# Patient Record
Sex: MALE | Race: WHITE | NOT HISPANIC OR LATINO
[De-identification: names, ages, dates, MRNs, and addresses within clinical notes are randomized per-mention and may not be internally consistent; named-entity substitution may affect disease eponyms.]

---

## 2017-08-22 ENCOUNTER — APPOINTMENT (OUTPATIENT)
Dept: OTOLARYNGOLOGY | Facility: CLINIC | Age: 77
End: 2017-08-22
Payer: MEDICARE

## 2017-08-22 VITALS
WEIGHT: 175 LBS | TEMPERATURE: 99.3 F | HEIGHT: 70 IN | HEART RATE: 90 BPM | DIASTOLIC BLOOD PRESSURE: 82 MMHG | BODY MASS INDEX: 25.05 KG/M2 | SYSTOLIC BLOOD PRESSURE: 146 MMHG

## 2017-08-22 PROCEDURE — 31238 NSL/SINS NDSC SRG NSL HEMRRG: CPT | Mod: RT

## 2017-08-22 PROCEDURE — 99214 OFFICE O/P EST MOD 30 MIN: CPT | Mod: 25

## 2017-10-12 ENCOUNTER — APPOINTMENT (OUTPATIENT)
Dept: OTOLARYNGOLOGY | Facility: CLINIC | Age: 77
End: 2017-10-12
Payer: MEDICARE

## 2017-10-12 VITALS
TEMPERATURE: 98.8 F | HEIGHT: 70 IN | DIASTOLIC BLOOD PRESSURE: 76 MMHG | HEART RATE: 81 BPM | SYSTOLIC BLOOD PRESSURE: 147 MMHG | BODY MASS INDEX: 27.2 KG/M2 | WEIGHT: 190 LBS

## 2017-10-12 PROCEDURE — 31231 NASAL ENDOSCOPY DX: CPT

## 2017-10-12 PROCEDURE — 99214 OFFICE O/P EST MOD 30 MIN: CPT | Mod: 25

## 2018-11-07 ENCOUNTER — LABORATORY RESULT (OUTPATIENT)
Age: 78
End: 2018-11-07

## 2018-11-08 ENCOUNTER — APPOINTMENT (OUTPATIENT)
Dept: OTOLARYNGOLOGY | Facility: CLINIC | Age: 78
End: 2018-11-08
Payer: MEDICARE

## 2018-11-08 VITALS
HEART RATE: 71 BPM | DIASTOLIC BLOOD PRESSURE: 81 MMHG | TEMPERATURE: 98.2 F | RESPIRATION RATE: 15 BRPM | OXYGEN SATURATION: 94 % | SYSTOLIC BLOOD PRESSURE: 159 MMHG

## 2018-11-08 PROCEDURE — 99214 OFFICE O/P EST MOD 30 MIN: CPT | Mod: 25

## 2018-11-08 PROCEDURE — 31575 DIAGNOSTIC LARYNGOSCOPY: CPT | Mod: 59

## 2018-11-08 PROCEDURE — 31231 NASAL ENDOSCOPY DX: CPT | Mod: 59

## 2018-11-08 RX ORDER — GUAIFENESIN 600 MG/1
600 TABLET, EXTENDED RELEASE ORAL TWICE DAILY
Qty: 120 | Refills: 3 | Status: ACTIVE | COMMUNITY
Start: 2018-11-08 | End: 1900-01-01

## 2018-11-09 ENCOUNTER — RX RENEWAL (OUTPATIENT)
Age: 78
End: 2018-11-09

## 2018-11-13 ENCOUNTER — RX RENEWAL (OUTPATIENT)
Age: 78
End: 2018-11-13

## 2019-05-20 ENCOUNTER — APPOINTMENT (OUTPATIENT)
Dept: OTOLARYNGOLOGY | Facility: CLINIC | Age: 79
End: 2019-05-20
Payer: MEDICARE

## 2019-05-20 VITALS
SYSTOLIC BLOOD PRESSURE: 132 MMHG | RESPIRATION RATE: 15 BRPM | OXYGEN SATURATION: 93 % | TEMPERATURE: 98.2 F | DIASTOLIC BLOOD PRESSURE: 84 MMHG | HEART RATE: 65 BPM

## 2019-05-20 DIAGNOSIS — J40 BRONCHITIS, NOT SPECIFIED AS ACUTE OR CHRONIC: ICD-10-CM

## 2019-05-20 DIAGNOSIS — R09.82 POSTNASAL DRIP: ICD-10-CM

## 2019-05-20 PROCEDURE — 31575 DIAGNOSTIC LARYNGOSCOPY: CPT

## 2019-05-20 PROCEDURE — 31233 NSL/SINS NDSC DX MAX SINUSC: CPT

## 2019-05-20 PROCEDURE — 99214 OFFICE O/P EST MOD 30 MIN: CPT | Mod: 25

## 2019-05-20 NOTE — CONSULT LETTER
[Consult Letter:] : I had the pleasure of evaluating your patient, [unfilled]. [Dear  ___] : Dear  [unfilled], [Consult Closing:] : Thank you very much for allowing me to participate in the care of this patient.  If you have any questions, please do not hesitate to contact me. [Please see my note below.] : Please see my note below. [Sincerely,] : Sincerely, [FreeTextEntry3] : \par Flako Barnes M.D., FACS, ECNU\par Director Center for Thyroid [facility] Parathyroid Surgery\par [facility] at [facility]\par Certified in Thyroid/Parathyroid/Neck Ultrasound, ECNU/ AIUM\par \par , Department of Otolaryngology\par [facility] [employer] at [facility]\par

## 2019-05-20 NOTE — HISTORY OF PRESENT ILLNESS
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  He has remained DAVID since his treatment.  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  He has been monitored by Atilio Estrada at [facility].  His last PET CT scan was obtained approximately 2 years ago with no evidence of recurrence.  He gained 10 pounds over the last 2 years.  He has restless leg syndrome and spinal stenosis associated with insomnia. 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  He has been in rehabilitation at [facility] and is making good progress. Last weekend he developed some hoarseness, a slight wheeze in his chest and a productive cough with thick brown secretions. He started a Zithromax pack on Monday and he had a low-grade fever and he has since defervesced. The cough has also improved.  He had been using a cough syrup with codeine.  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 He was seen by an allergist and found not to have any allergies.  He also has been having poor sleep and chronic fatigue. He now states that he his restless leg syndrome is much better on homeopathic meds. He does not have sleep apnea.  He was seen in ED at [facility] on May 10th for bronchitis and sinusitis/UTI.  He was treated with Rocephin and Robissin .  He had a pneumonia in April and admitted overnight then treated for 7 days on oral antibiotics.  He has persistent complaints of chest congestion, PND, rhinorrhea, intermittent chronic cough and continued difficulty sleeping due to frequent throat clearing. He had seen a pulmonologist in the past (Dr.Sidney Samaniego)..  He had also been treated by Dr Dolan for chronic sinusitis with nebulizers.  His voice is hoarse intermittently associated with throat clearing and a nocturnal nonproductive cough. He denies fever chills or night sweats.  He denies weight loss and his appetite is good. His TFTs are normal and monitored by his PCP.  A CT scan of the chest was obtained during his last ED visit and revealed Two perifissural nodules in the left upper lobe measuring up to 5 mm. There is no evidence of pneumonia. Additional comments were made regarding 2 simple cysts in the liver. There are less than 1 cm each. There were multiple left rib fractures which were new compared to a CT scan from 2017. The lung nodules appear to be stable compared to 2017.

## 2019-05-20 NOTE — PROCEDURE
[Flexible Endoscope] : examined with the flexible endoscope [Image(s) Captured] : image(s) captured and filed [Hoarseness] : hoarseness not clearly evaluated by indirect laryngoscopy [Complicated Symptoms] : complicated symptoms requiring more thorough examination than provided by mirror [Topical Lidocaine] : topical lidocaine [Serial Number: ___] : Serial Number: [unfilled] [FreeTextEntry1] : Sinus endoscopy diagnostic and drainage [FreeTextEntry3] : Procedure performed for obtaining directed sterile aspirates from the right middle meatus for cultures and sensitivities. The nasal septum is minimally deviated slightly to the left. There are no masses or or edema on the left with normal left middle, inferior and supreme turbinates.  The inferior and middle meati are clear on the left.  Nasopharynx is normal without mass.  On the right the middle meatus is blocked with polypoid edema and steaming mucopurulent drainage posteriorly.  The inferior meatus is edematous along with edema of the mucosa of the middle and inferior turbinates.  The supreme turbinate is not seen on the right.  The sphenoethmoidal recess and frontal recess is clear left and right.   Cultures were sent from directed aspirates from the right middle meatus via Lukin's trap.  [FreeTextEntry2] : Chronic right maxillary sinusitis [de-identified] : The nasal septum is minimally deviated to the right. OMC block/ edema noted on the right with purulent drainage. There are no masses or polyps and the nasal mucosa and secretions are normal. The choanae and posterior nasopharynx are normal without masses or drainage. The Eustachian tube orifices appear patent. The pharynx, including the posterior and lateral pharyngeal walls, the vallecula and base of tongue are normal without ulcerations, lesions or masses. The hypopharynx including the pyriform sinuses open well without pooling of secretions, mucosal lesions or masses. The supraglottic larynx including the epiglottis, petiole, arytenoids, glossoepiglottic, aryepiglottic and pharyngoepiglottic folds are normal without mucosal lesions, ulcerations or masses. The glottis reveals normal false vocal folds. The true vocal folds are glistening white, tense and of equal length, without paralysis, having symmetric mobility on adduction and abduction. There are no mucosal lesions, nodules, cysts, erythroplasia or leukoplakia. The posterior cricoid area has healthy pink mucosa in the interarytenoid area and esophageal inlet. There is minimal thickening/edema of the interarytenoid mucosa suggestive of posterior laryngitis from laryngopharyngeal acid reflux disease. The trachea is clear without narrowing in the immediate subglottic region, without deviation or lesions. \par

## 2019-05-20 NOTE — REASON FOR VISIT
[Ear Drainage] : ear drainage [FreeTextEntry2] : followup for squamous cell cancer of left tonsil treated in 2004 with EBRT and a left modified radical neck dissection, recent treatment for bronchitis, UTI and chronic PND/sinusitis  [FreeTextEntry1] : PCP is Naomi Loving MD

## 2019-05-21 ENCOUNTER — APPOINTMENT (OUTPATIENT)
Dept: OTOLARYNGOLOGY | Facility: CLINIC | Age: 79
End: 2019-05-21
Payer: MEDICARE

## 2019-05-21 VITALS
OXYGEN SATURATION: 96 % | SYSTOLIC BLOOD PRESSURE: 128 MMHG | BODY MASS INDEX: 27.2 KG/M2 | DIASTOLIC BLOOD PRESSURE: 77 MMHG | HEIGHT: 70 IN | WEIGHT: 190 LBS | HEART RATE: 70 BPM

## 2019-05-21 DIAGNOSIS — J34.2 DEVIATED NASAL SEPTUM: ICD-10-CM

## 2019-05-21 PROCEDURE — 31231 NASAL ENDOSCOPY DX: CPT

## 2019-05-21 PROCEDURE — 99214 OFFICE O/P EST MOD 30 MIN: CPT | Mod: 25

## 2019-05-21 NOTE — ASSESSMENT
[FreeTextEntry1] : The patient has recent onset of purulent rhinorrhea from the right nose and cough. He produces approximately less than 1/2 teaspoon of discolored mucus on a daily basis. He was seen by Dr. chaves off yesterday it underwent extensive evaluation. Culture was taken from the right middle meatus and results pending. Our examination today is consistent with infection arising from the right middle meatus, possibly maxillary sinus.\par \par As patient did well after antibiotic treatment for approximately 8 months we most likely will resume that treatment and then reevaluate the patient. We'll contact the patient in approximately 2 days for results of culture.

## 2019-05-21 NOTE — PHYSICAL EXAM
[Nasal Endoscopy Performed] : nasal endoscopy was performed, see procedure section for findings [de-identified] : purulence right MM [Midline] : trachea located in midline position [Normal] : no rashes [FreeTextEntry2] : post neck dissection

## 2019-05-21 NOTE — HISTORY OF PRESENT ILLNESS
[de-identified] : 77 year patient seen day for rhinorrhea. Pat sinus surgery and last seen one His health is reviewed in previous notes. [FreeTextEntry1] : 10-- Patient returns after antibiotics and overall well.\par 5-- He now seen after approximately 8 months last visit. He had been doing well but recently has developed cough and what he describes as less than 1 teaspoon in the day of purulent rhinorrhea. Cultures were taken by Dr. anastacio rodriguez recently and results are pending.

## 2019-05-21 NOTE — PROCEDURE
[Osteomeatal Pathology] : osteomeatal pathology [Topical Lidocaine] : topical lidocaine [Oxymetazoline HCl] : oxymetazoline HCl [FreeTextEntry6] : The endoscopy performed a 0° 4 mm rigid endoscope under local anesthesia. The shows partial septal deviation. Purulent material was noted arising from the right middle meatus region. As patient has undergone endoscopic culturing of this area yesterday we'll not repeat same.

## 2019-05-28 LAB — BACTERIA NOSE AEROBE CULT: ABNORMAL

## 2019-06-20 ENCOUNTER — APPOINTMENT (OUTPATIENT)
Dept: OTOLARYNGOLOGY | Facility: CLINIC | Age: 79
End: 2019-06-20
Payer: MEDICARE

## 2019-06-20 VITALS
SYSTOLIC BLOOD PRESSURE: 135 MMHG | OXYGEN SATURATION: 96 % | HEIGHT: 70 IN | WEIGHT: 185 LBS | BODY MASS INDEX: 26.48 KG/M2 | HEART RATE: 70 BPM | DIASTOLIC BLOOD PRESSURE: 70 MMHG

## 2019-06-20 PROCEDURE — 99214 OFFICE O/P EST MOD 30 MIN: CPT | Mod: 25

## 2019-06-20 PROCEDURE — 31231 NASAL ENDOSCOPY DX: CPT

## 2019-06-20 NOTE — ASSESSMENT
[FreeTextEntry1] : Patient returns after trial of antibiotics for right maxillary ethmoid sinusitis. Patient continues to have purulent postnasal drainage. Given the above and he is right maxillary ethmoid sinusitis there are posing A. right antrostomy, a with removal contents the sinus and ethmoidectomy. He was diagnosed we utilized. Patient's septum is primarily deviated to the right so hopefully septoplasty would not be needed. Patient gave informed consent.

## 2019-06-20 NOTE — PHYSICAL EXAM
[Nasal Endoscopy Performed] : nasal endoscopy was performed, see procedure section for findings [] : septum deviated bilaterally [Removed] : palatine tonsils previously removed [Normal] : no rashes

## 2019-06-20 NOTE — HISTORY OF PRESENT ILLNESS
[de-identified] : 77 year patient seen day for rhinorrhea. Pat sinus surgery and last seen one His health is reviewed in previous notes. [FreeTextEntry1] : 10-- Patient returns after antibiotics and overall well.\par 5-- He now seen after approximately 8 months last visit. He had been doing well but recently has developed cough and what he describes as less than 1 teaspoon in the day of purulent rhinorrhea. Cultures were taken by Dr. anastacio rodriguez recently and results are pending.\par 6-- Patient now returns after several weeks treatment with Bactrim for right maxillary sinusitis. EKG is to have postnasal drainage and subsequent cough. Previous imaging was consistent with above. As originally planned if this did not help treat his sinus problem we'll then consider sinus surgery.

## 2019-07-15 ENCOUNTER — RESULT REVIEW (OUTPATIENT)
Age: 79
End: 2019-07-15

## 2019-07-15 ENCOUNTER — APPOINTMENT (OUTPATIENT)
Dept: OTOLARYNGOLOGY | Facility: AMBULATORY SURGERY CENTER | Age: 79
End: 2019-07-15

## 2019-07-15 ENCOUNTER — OUTPATIENT (OUTPATIENT)
Dept: OUTPATIENT SERVICES | Facility: HOSPITAL | Age: 79
LOS: 1 days | Discharge: HOME | End: 2019-07-15
Payer: MEDICARE

## 2019-07-15 PROCEDURE — 31254 NSL/SINS NDSC W/PRTL ETHMDCT: CPT | Mod: RT

## 2019-07-15 PROCEDURE — 30520 REPAIR OF NASAL SEPTUM: CPT

## 2019-07-15 PROCEDURE — 31267 ENDOSCOPY MAXILLARY SINUS: CPT | Mod: RT

## 2019-07-15 PROCEDURE — 61782 SCAN PROC CRANIAL EXTRA: CPT

## 2019-07-16 LAB
GRAM STN FLD: SIGNIFICANT CHANGE UP
SPECIMEN SOURCE: SIGNIFICANT CHANGE UP

## 2019-07-17 LAB
-  CEFAZOLIN: SIGNIFICANT CHANGE UP
-  CLINDAMYCIN: SIGNIFICANT CHANGE UP
-  ERYTHROMYCIN: SIGNIFICANT CHANGE UP
-  LINEZOLID: SIGNIFICANT CHANGE UP
-  OXACILLIN: SIGNIFICANT CHANGE UP
-  PENICILLIN: SIGNIFICANT CHANGE UP
-  RIFAMPIN: SIGNIFICANT CHANGE UP
-  TRIMETHOPRIM/SULFAMETHOXAZOLE: SIGNIFICANT CHANGE UP
-  VANCOMYCIN: SIGNIFICANT CHANGE UP
CULTURE RESULTS: SIGNIFICANT CHANGE UP
METHOD TYPE: SIGNIFICANT CHANGE UP
ORGANISM # SPEC MICROSCOPIC CNT: SIGNIFICANT CHANGE UP
ORGANISM # SPEC MICROSCOPIC CNT: SIGNIFICANT CHANGE UP
SPECIMEN SOURCE: SIGNIFICANT CHANGE UP

## 2019-07-19 LAB — SURGICAL PATHOLOGY STUDY: SIGNIFICANT CHANGE UP

## 2019-07-25 ENCOUNTER — APPOINTMENT (OUTPATIENT)
Dept: OTOLARYNGOLOGY | Facility: CLINIC | Age: 79
End: 2019-07-25
Payer: MEDICARE

## 2019-07-25 VITALS
SYSTOLIC BLOOD PRESSURE: 134 MMHG | HEIGHT: 70 IN | HEART RATE: 68 BPM | DIASTOLIC BLOOD PRESSURE: 76 MMHG | TEMPERATURE: 98 F

## 2019-07-25 DIAGNOSIS — J34.2 DEVIATED NASAL SEPTUM: ICD-10-CM

## 2019-07-25 DIAGNOSIS — J32.0 CHRONIC MAXILLARY SINUSITIS: ICD-10-CM

## 2019-07-25 DIAGNOSIS — J32.2 CHRONIC ETHMOIDAL SINUSITIS: ICD-10-CM

## 2019-07-25 PROCEDURE — 99024 POSTOP FOLLOW-UP VISIT: CPT

## 2019-07-25 PROCEDURE — 31237 NSL/SINS NDSC SURG BX POLYPC: CPT | Mod: RT,58

## 2019-07-25 NOTE — ASSESSMENT
[FreeTextEntry1] : Patient returns after surgery. Overall he recovering from surgery. We have recommended after examination today and debridement of the right maxillary ethmoid region that he should lavage his nose twice daily with the addition of Pulmicort. He should complete his current antibiotics and we will see him in 6 weeks.

## 2019-07-25 NOTE — PROCEDURE
[Debridement] : debridement  [Anterior rhinoscopy insufficient to account for symptoms] : anterior rhinoscopy insufficient to account for symptoms [Topical Lidocaine] : topical lidocaine [Oxymetazoline HCl] : oxymetazoline HCl [Rigid Endoscope] : examined with a rigid endoscope [Right] : debridement of the right nasal cavity [FreeTextEntry6] : Nasal endoscopy performed for local anesthesia to evaluate the nasal airway and sinuses. Her mucous and debris noted. This was then debrided and inflammation is noted as the outflow tract of the sinuses continues to heal.

## 2019-07-25 NOTE — HISTORY OF PRESENT ILLNESS
[de-identified] : 77 year patient seen day for rhinorrhea. Pat sinus surgery and last seen one His health is reviewed in previous notes. [FreeTextEntry1] : 10-- Patient returns after antibiotics and overall well.\par 5-- He now seen after approximately 8 months last visit. He had been doing well but recently has developed cough and what he describes as less than 1 teaspoon in the day of purulent rhinorrhea. Cultures were taken by Dr. anastacio rodriguez recently and results are pending.\par 6-- Patient now returns after several weeks treatment with Bactrim for right maxillary sinusitis. EKG is to have postnasal drainage and subsequent cough. Previous imaging was consistent with above. As originally planned if this did not help treat his sinus problem we'll then consider sinus surgery.\par 7-- Patient returns after undergoing right antrostomy with removal of cyst and chronic infection with septoplasty access the antrum. Patient has been recovering uneventfully and lavage his nose once per day. He recommended he increase this to twice per day. He still on antibiotics which should be completed in approximately 2 weeks.

## 2019-09-12 ENCOUNTER — APPOINTMENT (OUTPATIENT)
Dept: OTOLARYNGOLOGY | Facility: CLINIC | Age: 79
End: 2019-09-12

## 2020-09-03 ENCOUNTER — APPOINTMENT (OUTPATIENT)
Dept: OTOLARYNGOLOGY | Facility: CLINIC | Age: 80
End: 2020-09-03
Payer: MEDICARE

## 2020-09-03 VITALS
HEART RATE: 75 BPM | DIASTOLIC BLOOD PRESSURE: 84 MMHG | SYSTOLIC BLOOD PRESSURE: 151 MMHG | OXYGEN SATURATION: 95 % | TEMPERATURE: 97.8 F

## 2020-09-03 PROCEDURE — 31575 DIAGNOSTIC LARYNGOSCOPY: CPT

## 2020-09-03 PROCEDURE — 99214 OFFICE O/P EST MOD 30 MIN: CPT | Mod: 25

## 2020-09-03 NOTE — CONSULT LETTER
[Dear  ___] : Dear  [unfilled], [Consult Letter:] : I had the pleasure of evaluating your patient, [unfilled]. [Please see my note below.] : Please see my note below. [Consult Closing:] : Thank you very much for allowing me to participate in the care of this patient.  If you have any questions, please do not hesitate to contact me. [Sincerely,] : Sincerely, [FreeTextEntry3] : \par Flako Barnes M.D., FACS, ECNU\par Director Center for Thyroid & Parathyroid Surgery\par [facility] [place] at [facility]\par Certified in Thyroid/Parathyroid/Neck Ultrasound, ECNU/ AIUM\par \par , Department of Otolaryngology\par [facility] [employer] at [facility]\par

## 2020-09-03 NOTE — PROCEDURE
[Image(s) Captured] : image(s) captured and filed [Complicated Symptoms] : complicated symptoms requiring more thorough examination than provided by mirror [Hoarseness] : hoarseness not clearly evaluated by indirect laryngoscopy [Flexible Endoscope] : examined with the flexible endoscope [Topical Lidocaine] : topical lidocaine [Unable to Cooperate with Mirror] : patient unable to cooperate with mirror [Gag Reflex] : gag reflex preventing mirror examination [Oxymetazoline HCl] : oxymetazoline HCl [Serial Number: ___] : Serial Number: [unfilled] [de-identified] : The nasal septum is minimally deviated to the right. Interval right FESS.  All ostia appear patent without active drainage or polyps.  There are no masses or polyps and the nasal mucosa and secretions are normal. The choanae and posterior nasopharynx are normal without masses or drainage. The Eustachian tube orifices appear patent. The pharynx, including the posterior and lateral pharyngeal walls, the vallecula and base of tongue are normal without ulcerations, lesions or masses. The hypopharynx including the pyriform sinuses open well without pooling of secretions, mucosal lesions or masses. The supraglottic larynx including the epiglottis, petiole, arytenoids, glossoepiglottic, aryepiglottic and pharyngoepiglottic folds are normal without mucosal lesions, ulcerations or masses. The glottis reveals normal false vocal folds. The true vocal folds are glistening white, tense and of equal length, without paralysis, having symmetric mobility on adduction and abduction. There are no mucosal lesions, nodules, cysts, erythroplasia or leukoplakia. The posterior cricoid area has healthy pink mucosa in the interarytenoid area and esophageal inlet. There is minimal thickening/edema of the interarytenoid mucosa suggestive of posterior laryngitis from laryngopharyngeal acid reflux disease.  Mucus is very thick due to radiation in the past. The trachea is clear without narrowing in the immediate subglottic region, without deviation or lesions. \par  [de-identified] : chronic cough/sinusitis

## 2020-09-03 NOTE — HISTORY OF PRESENT ILLNESS
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  He has remained DAVID since his treatment.  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  He has been monitored by Atilio Estrada at [facility].  His last PET CT scan was obtained approximately 2 years ago with no evidence of recurrence.  He gained 10 pounds over the last 2 years.  He has restless leg syndrome and spinal stenosis associated with insomnia. 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  He has been in rehabilitation at [facility] and is making good progress. Last weekend he developed some hoarseness, a slight wheeze in his chest and a productive cough with thick brown secretions. He started a Zithromax pack on Monday and he had a low-grade fever and he has since defervesced. The cough has also improved.  He had been using a cough syrup with codeine.  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 He was seen by an allergist and found not to have any allergies.  He also has been having poor sleep and increasing chronic fatigue. His restless leg syndrome is much better on homeopathic meds. He does not have sleep apnea.  He has now had two FESS procedures with both Dr. Dolan and Dr. Marcus but continues to have chronic sinus drainage despite daily BID saline irrigations with a bulb syringe.  He had not used the Grosan irrigating device as I had recommended as he finds it uncomfortable.   He uses Robissin.  He had a pneumonia in April of last year and treated for 7 days on oral antibiotics.  He has persistent complaints of chest congestion, PND, rhinorrhea, intermittent chronic cough and continued difficulty sleeping due to frequent throat clearing. He had seen a pulmonologist in the past Leonel Owusu.  He has not been back to Dr Dolan for managing chronic sinusitis with nebulizers.  His voice is hoarse intermittently associated with throat clearing and a nocturnal nonproductive cough. He denies fever chills or night sweats.  He denies weight loss and his appetite is good. His TFTs had been normal and monitored by his PCP.  A CT scan of the chest was obtained during his last ED visit last year and revealed two perifissural nodules in the left upper lobe measuring up to 5 mm.  He is not sure if this needs follow up.  Additional comments were made regarding 2 simple cysts in the liver. These are less than 1 cm each. There were multiple left rib fractures which were new compared to a CT scan from 2017. The lung nodules had been stable compared to 2017.

## 2021-09-07 ENCOUNTER — APPOINTMENT (OUTPATIENT)
Dept: OTOLARYNGOLOGY | Facility: CLINIC | Age: 81
End: 2021-09-07
Payer: MEDICARE

## 2021-09-07 VITALS
HEIGHT: 70 IN | BODY MASS INDEX: 31.5 KG/M2 | HEART RATE: 68 BPM | SYSTOLIC BLOOD PRESSURE: 133 MMHG | WEIGHT: 220 LBS | DIASTOLIC BLOOD PRESSURE: 72 MMHG | TEMPERATURE: 98.3 F | OXYGEN SATURATION: 95 %

## 2021-09-07 VITALS — BODY MASS INDEX: 31.5 KG/M2 | HEIGHT: 70 IN | WEIGHT: 220 LBS

## 2021-09-07 DIAGNOSIS — R49.9 UNSPECIFIED VOICE AND RESONANCE DISORDER: ICD-10-CM

## 2021-09-07 PROCEDURE — 99215 OFFICE O/P EST HI 40 MIN: CPT | Mod: 25

## 2021-09-07 PROCEDURE — 31575 DIAGNOSTIC LARYNGOSCOPY: CPT

## 2021-09-07 NOTE — HISTORY OF PRESENT ILLNESS
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  He has remained DAVID since his treatment.  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  He has been monitored by Atilio Estrada at [facility].  His last PET CT scan was obtained approximately 2 years ago with no evidence of recurrence.  He gained 10 pounds over the last 2 years.  He has restless leg syndrome and spinal stenosis associated with insomnia. 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  He has been in rehabilitation at [facility] and is making good progress. Last weekend he developed some hoarseness, a slight wheeze in his chest and a productive cough with thick brown secretions. He started a Zithromax pack on Monday and he had a low-grade fever and he has since defervesced. The cough has also improved.  He had been using a cough syrup with codeine.  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 He was seen by an allergist and found not to have any allergies.  He also has been having poor sleep and increasing chronic fatigue. His restless leg syndrome is much better on homeopathic meds. He does not have sleep apnea.  He has now had two FESS procedures with both Dr. Dolan and Dr. Marcus but continues to have chronic sinus drainage despite daily BID saline irrigations with a bulb syringe.  He uses the Grosan irrigating device sporadically   He uses Robissin.  He had a pneumonia in the past treated for 7 days on oral antibiotics.  He has persistent complaints of chest congestion, PND, rhinorrhea, intermittent chronic cough and continued difficulty sleeping due to frequent throat clearing. He had seen a pulmonologist in the past Leonel Vernon). He complains of dry itchy skin.  He has not been back to ENT for managing chronic sinusitis with nebulizers.  His voice is hoarse intermittently associated with throat clearing and a nocturnal nonproductive cough. He denies fever chills or night sweats.  He denies weight loss and his appetite is good. His TFTs had been normal and monitored by his PCP.  A CT scan of the chest was obtained during his last ED visit last year and revealed two perifissural nodules in the left upper lobe measuring up to 5 mm.  He is not sure if this needs follow up and to see Leonel Vernon his pulmonologist.  Additional comments were made regarding 2 simple cysts in the liver. These are less than 1 cm each. There were multiple left rib fractures which were new compared to a CT scan from 2017. The lung nodules had been stable compared to 2017.

## 2021-09-07 NOTE — PROCEDURE
[Image(s) Captured] : image(s) captured and filed [Unable to Cooperate with Mirror] : patient unable to cooperate with mirror [Gag Reflex] : gag reflex preventing mirror examination [Hoarseness] : hoarseness not clearly evaluated by indirect laryngoscopy [Complicated Symptoms] : complicated symptoms requiring more thorough examination than provided by mirror [Topical Lidocaine] : topical lidocaine [Oxymetazoline HCl] : oxymetazoline HCl [Flexible Endoscope] : examined with the flexible endoscope [Serial Number: ___] : Serial Number: [unfilled] [de-identified] : The nasal septum is minimally deviated to the right. Interval right FESS.  All ostia appear patent without active drainage or polyps.  There are no masses or polyps and the nasal mucosa and secretions are normal. The choanae and posterior nasopharynx are normal without masses or drainage. The Eustachian tube orifices appear patent. The pharynx, including the posterior and lateral pharyngeal walls, the vallecula and base of tongue are normal without ulcerations, lesions or masses. The hypopharynx including the pyriform sinuses open well without pooling of secretions, mucosal lesions or masses. The supraglottic larynx including the epiglottis, petiole, arytenoids, glossoepiglottic, aryepiglottic and pharyngoepiglottic folds are normal without mucosal lesions, ulcerations or masses. The glottis reveals normal false vocal folds. The true vocal folds are glistening white, tense and of equal length, without paralysis, having symmetric mobility on adduction and abduction. There are no mucosal lesions, nodules, cysts, erythroplasia or leukoplakia. The posterior cricoid area has healthy pink mucosa in the interarytenoid area and esophageal inlet. There is slight thickening/edema of the interarytenoid mucosa suggestive of posterior laryngitis from laryngopharyngeal acid reflux disease.  Mucus is very thick due to radiation in the past. The trachea is clear without narrowing in the immediate subglottic region, without deviation or lesions. \par  [de-identified] : chronic cough/sinusitis, head and neck cancer

## 2021-09-07 NOTE — CONSULT LETTER
[Dear  ___] : Dear  [unfilled], [Consult Letter:] : I had the pleasure of evaluating your patient, [unfilled]. [Please see my note below.] : Please see my note below. [Consult Closing:] : Thank you very much for allowing me to participate in the care of this patient.  If you have any questions, please do not hesitate to contact me. [Sincerely,] : Sincerely, [DrYany  ___] : Dr. JOSHUA [FreeTextEntry3] : \par Flako Barnes M.D., FACS, ECNU\par Director Center for Thyroid & Parathyroid Surgery\par [facility]\par Certified in Thyroid/Parathyroid/Neck Ultrasound, ECNU/ AIUM\par \par , Department of Otolaryngology\par [facility] [employer] at [facility]\par

## 2022-09-04 ENCOUNTER — EMERGENCY (EMERGENCY)
Facility: HOSPITAL | Age: 82
LOS: 1 days | Discharge: HOME | End: 2022-09-04
Attending: EMERGENCY MEDICINE | Admitting: EMERGENCY MEDICINE
Payer: MEDICARE

## 2022-09-04 VITALS
TEMPERATURE: 98 F | DIASTOLIC BLOOD PRESSURE: 77 MMHG | SYSTOLIC BLOOD PRESSURE: 147 MMHG | HEART RATE: 75 BPM | OXYGEN SATURATION: 96 % | RESPIRATION RATE: 18 BRPM

## 2022-09-04 VITALS
HEART RATE: 76 BPM | OXYGEN SATURATION: 94 % | TEMPERATURE: 98 F | DIASTOLIC BLOOD PRESSURE: 71 MMHG | SYSTOLIC BLOOD PRESSURE: 130 MMHG | RESPIRATION RATE: 18 BRPM

## 2022-09-04 LAB
ALBUMIN SERPL ELPH-MCNC: 4.8 G/DL — SIGNIFICANT CHANGE UP (ref 3.3–5)
ALP SERPL-CCNC: 62 U/L — SIGNIFICANT CHANGE UP (ref 40–120)
ALT FLD-CCNC: 21 U/L — SIGNIFICANT CHANGE UP (ref 10–45)
ANION GAP SERPL CALC-SCNC: 10 MMOL/L — SIGNIFICANT CHANGE UP (ref 5–17)
APPEARANCE UR: CLEAR — SIGNIFICANT CHANGE UP
APTT BLD: 29 SEC — SIGNIFICANT CHANGE UP (ref 27.5–35.5)
AST SERPL-CCNC: 22 U/L — SIGNIFICANT CHANGE UP (ref 10–40)
BACTERIA # UR AUTO: PRESENT /HPF
BASOPHILS # BLD AUTO: 0.03 K/UL — SIGNIFICANT CHANGE UP (ref 0–0.2)
BASOPHILS NFR BLD AUTO: 0.4 % — SIGNIFICANT CHANGE UP (ref 0–2)
BILIRUB SERPL-MCNC: 0.4 MG/DL — SIGNIFICANT CHANGE UP (ref 0.2–1.2)
BILIRUB UR-MCNC: NEGATIVE — SIGNIFICANT CHANGE UP
BLD GP AB SCN SERPL QL: NEGATIVE — SIGNIFICANT CHANGE UP
BUN SERPL-MCNC: 9 MG/DL — SIGNIFICANT CHANGE UP (ref 7–23)
CALCIUM SERPL-MCNC: 9 MG/DL — SIGNIFICANT CHANGE UP (ref 8.4–10.5)
CHLORIDE SERPL-SCNC: 102 MMOL/L — SIGNIFICANT CHANGE UP (ref 96–108)
CO2 SERPL-SCNC: 27 MMOL/L — SIGNIFICANT CHANGE UP (ref 22–31)
COLOR SPEC: YELLOW — SIGNIFICANT CHANGE UP
COMMENT - URINE: SIGNIFICANT CHANGE UP
CREAT SERPL-MCNC: 0.88 MG/DL — SIGNIFICANT CHANGE UP (ref 0.5–1.3)
DIFF PNL FLD: ABNORMAL
EGFR: 85 ML/MIN/1.73M2 — SIGNIFICANT CHANGE UP
EOSINOPHIL # BLD AUTO: 0.1 K/UL — SIGNIFICANT CHANGE UP (ref 0–0.5)
EOSINOPHIL NFR BLD AUTO: 1.2 % — SIGNIFICANT CHANGE UP (ref 0–6)
EPI CELLS # UR: SIGNIFICANT CHANGE UP /HPF (ref 0–5)
GLUCOSE SERPL-MCNC: 133 MG/DL — HIGH (ref 70–99)
GLUCOSE UR QL: NEGATIVE — SIGNIFICANT CHANGE UP
HCT VFR BLD CALC: 42.1 % — SIGNIFICANT CHANGE UP (ref 39–50)
HGB BLD-MCNC: 14 G/DL — SIGNIFICANT CHANGE UP (ref 13–17)
IMM GRANULOCYTES NFR BLD AUTO: 0.9 % — SIGNIFICANT CHANGE UP (ref 0–1.5)
INR BLD: 0.98 — SIGNIFICANT CHANGE UP (ref 0.88–1.16)
KETONES UR-MCNC: NEGATIVE — SIGNIFICANT CHANGE UP
LEUKOCYTE ESTERASE UR-ACNC: NEGATIVE — SIGNIFICANT CHANGE UP
LYMPHOCYTES # BLD AUTO: 0.96 K/UL — LOW (ref 1–3.3)
LYMPHOCYTES # BLD AUTO: 11.8 % — LOW (ref 13–44)
MCHC RBC-ENTMCNC: 33.3 GM/DL — SIGNIFICANT CHANGE UP (ref 32–36)
MCHC RBC-ENTMCNC: 33.3 PG — SIGNIFICANT CHANGE UP (ref 27–34)
MCV RBC AUTO: 100 FL — SIGNIFICANT CHANGE UP (ref 80–100)
MONOCYTES # BLD AUTO: 0.44 K/UL — SIGNIFICANT CHANGE UP (ref 0–0.9)
MONOCYTES NFR BLD AUTO: 5.4 % — SIGNIFICANT CHANGE UP (ref 2–14)
NEUTROPHILS # BLD AUTO: 6.53 K/UL — SIGNIFICANT CHANGE UP (ref 1.8–7.4)
NEUTROPHILS NFR BLD AUTO: 80.3 % — HIGH (ref 43–77)
NITRITE UR-MCNC: NEGATIVE — SIGNIFICANT CHANGE UP
NRBC # BLD: 0 /100 WBCS — SIGNIFICANT CHANGE UP (ref 0–0)
PH UR: 6 — SIGNIFICANT CHANGE UP (ref 5–8)
PLATELET # BLD AUTO: 277 K/UL — SIGNIFICANT CHANGE UP (ref 150–400)
POTASSIUM SERPL-MCNC: 3.9 MMOL/L — SIGNIFICANT CHANGE UP (ref 3.5–5.3)
POTASSIUM SERPL-SCNC: 3.9 MMOL/L — SIGNIFICANT CHANGE UP (ref 3.5–5.3)
PROT SERPL-MCNC: 7.6 G/DL — SIGNIFICANT CHANGE UP (ref 6–8.3)
PROT UR-MCNC: ABNORMAL MG/DL
PROTHROM AB SERPL-ACNC: 11.6 SEC — SIGNIFICANT CHANGE UP (ref 10.5–13.4)
RBC # BLD: 4.21 M/UL — SIGNIFICANT CHANGE UP (ref 4.2–5.8)
RBC # FLD: 13.5 % — SIGNIFICANT CHANGE UP (ref 10.3–14.5)
RBC CASTS # UR COMP ASSIST: < 5 /HPF — SIGNIFICANT CHANGE UP
RH IG SCN BLD-IMP: POSITIVE — SIGNIFICANT CHANGE UP
SARS-COV-2 RNA SPEC QL NAA+PROBE: NEGATIVE — SIGNIFICANT CHANGE UP
SODIUM SERPL-SCNC: 139 MMOL/L — SIGNIFICANT CHANGE UP (ref 135–145)
SP GR SPEC: 1.01 — SIGNIFICANT CHANGE UP (ref 1–1.03)
UROBILINOGEN FLD QL: 0.2 E.U./DL — SIGNIFICANT CHANGE UP
WBC # BLD: 8.13 K/UL — SIGNIFICANT CHANGE UP (ref 3.8–10.5)
WBC # FLD AUTO: 8.13 K/UL — SIGNIFICANT CHANGE UP (ref 3.8–10.5)
WBC UR QL: ABNORMAL /HPF

## 2022-09-04 PROCEDURE — 74177 CT ABD & PELVIS W/CONTRAST: CPT | Mod: MA

## 2022-09-04 PROCEDURE — 99284 EMERGENCY DEPT VISIT MOD MDM: CPT

## 2022-09-04 PROCEDURE — 73120 X-RAY EXAM OF HAND: CPT | Mod: 26,LT

## 2022-09-04 PROCEDURE — 70450 CT HEAD/BRAIN W/O DYE: CPT | Mod: MA

## 2022-09-04 PROCEDURE — 72125 CT NECK SPINE W/O DYE: CPT | Mod: 26,MA

## 2022-09-04 PROCEDURE — 85610 PROTHROMBIN TIME: CPT

## 2022-09-04 PROCEDURE — 80053 COMPREHEN METABOLIC PANEL: CPT

## 2022-09-04 PROCEDURE — 71045 X-RAY EXAM CHEST 1 VIEW: CPT

## 2022-09-04 PROCEDURE — 85025 COMPLETE CBC W/AUTO DIFF WBC: CPT

## 2022-09-04 PROCEDURE — 70450 CT HEAD/BRAIN W/O DYE: CPT | Mod: 26,MA

## 2022-09-04 PROCEDURE — 86900 BLOOD TYPING SEROLOGIC ABO: CPT

## 2022-09-04 PROCEDURE — 71045 X-RAY EXAM CHEST 1 VIEW: CPT | Mod: 26

## 2022-09-04 PROCEDURE — 71260 CT THORAX DX C+: CPT | Mod: 26,MA

## 2022-09-04 PROCEDURE — 86901 BLOOD TYPING SEROLOGIC RH(D): CPT

## 2022-09-04 PROCEDURE — 99285 EMERGENCY DEPT VISIT HI MDM: CPT | Mod: 25

## 2022-09-04 PROCEDURE — 73030 X-RAY EXAM OF SHOULDER: CPT

## 2022-09-04 PROCEDURE — 72125 CT NECK SPINE W/O DYE: CPT | Mod: MA

## 2022-09-04 PROCEDURE — 86850 RBC ANTIBODY SCREEN: CPT

## 2022-09-04 PROCEDURE — 74177 CT ABD & PELVIS W/CONTRAST: CPT | Mod: 26,MA

## 2022-09-04 PROCEDURE — 85730 THROMBOPLASTIN TIME PARTIAL: CPT

## 2022-09-04 PROCEDURE — 70486 CT MAXILLOFACIAL W/O DYE: CPT | Mod: MA

## 2022-09-04 PROCEDURE — 36415 COLL VENOUS BLD VENIPUNCTURE: CPT

## 2022-09-04 PROCEDURE — 73030 X-RAY EXAM OF SHOULDER: CPT | Mod: 26,RT

## 2022-09-04 PROCEDURE — 73120 X-RAY EXAM OF HAND: CPT

## 2022-09-04 PROCEDURE — 93005 ELECTROCARDIOGRAM TRACING: CPT

## 2022-09-04 PROCEDURE — 70486 CT MAXILLOFACIAL W/O DYE: CPT | Mod: 26,MA

## 2022-09-04 PROCEDURE — 87635 SARS-COV-2 COVID-19 AMP PRB: CPT

## 2022-09-04 PROCEDURE — 81001 URINALYSIS AUTO W/SCOPE: CPT

## 2022-09-04 PROCEDURE — 71260 CT THORAX DX C+: CPT | Mod: MA

## 2022-09-04 RX ORDER — SODIUM CHLORIDE 9 MG/ML
500 INJECTION INTRAMUSCULAR; INTRAVENOUS; SUBCUTANEOUS ONCE
Refills: 0 | Status: COMPLETED | OUTPATIENT
Start: 2022-09-04 | End: 2022-09-04

## 2022-09-04 RX ORDER — SODIUM CHLORIDE 9 MG/ML
250 INJECTION INTRAMUSCULAR; INTRAVENOUS; SUBCUTANEOUS ONCE
Refills: 0 | Status: DISCONTINUED | OUTPATIENT
Start: 2022-09-04 | End: 2022-09-04

## 2022-09-04 RX ADMIN — SODIUM CHLORIDE 500 MILLILITER(S): 9 INJECTION INTRAMUSCULAR; INTRAVENOUS; SUBCUTANEOUS at 11:25

## 2022-09-04 NOTE — ED ADULT TRIAGE NOTE - OTHER COMPLAINTS
EMS reports patient is on Eliquis and Xarelto. Bruising noted to face and right hand.. Petechiae noted to chest. Patient c/o dizziness. EMS reports patient is on Eliquis and Xarelto. Bruising noted to face and left hand.. Petechiae noted to chest. Patient c/o dizziness.

## 2022-09-04 NOTE — ED PROVIDER NOTE - NSFOLLOWUPINSTRUCTIONS_ED_ALL_ED_FT
To patient's primary care doctor: Please reassess patient's anticoagulation use/blood thinner use as he presented to ED after a fall and is a high risk for further falls, bleeding risk.    [facility]  Family Medicine  [address], [address] [place] [address]  Phone: [phone]

## 2022-09-04 NOTE — ED PROVIDER NOTE - NS ED ATTENDING STATEMENT MOD
This was a shared visit with the EVON. I reviewed and verified the documentation and independently performed the documented: Attending Only

## 2022-09-04 NOTE — ED ADULT NURSE NOTE - OTHER COMPLAINTS
EMS reports patient is on Eliquis and Xarelto. Bruising noted to face and left hand.. Petechiae noted to chest. Patient c/o dizziness.

## 2022-09-04 NOTE — ED ADULT NURSE NOTE - OBJECTIVE STATEMENT
Patient presents to the ED via EMS. As per EMS, patient woke up, had breakfast this morning, the aide put him back this morning. As per aide, patient fell off the bed. Patient on blood thinners, noted to have abrasion to right side of head and face, complaining of right shoulder pain and back pain. Patient awake and alert. Denies any neck pain. Upgraded to MD Dixon. Patient presents to the ED via EMS. As per EMS, patient woke up, had breakfast this morning, the aide put him back in bed this morning after breakfast and patient fell.  As per aide, patient fell off the bed or possibly going to the bathroom, aide heard a sound and went to see and patient was on the floor.  Patient on blood thinners, noted to have abrasion to right side of head, face and ear, complaining of right shoulder pain and back pain. Patient also noted to have bruising to bilateral knees.  Patient awake and alert to self. Denies any neck pain. Upgraded to MD Dixon.

## 2022-09-04 NOTE — ED PROVIDER NOTE - PHYSICAL EXAMINATION

## 2022-09-04 NOTE — ED PROVIDER NOTE - OBJECTIVE STATEMENT
81 yo currently on eliquis and xarelto per paperwork w complaints of fall from bed, roll over and fell on the R side, now w R facial pain, L hand bruising, R shoulder pain. Pt noted w bruising to chest. Denies abd pain, or any other extremity injury. Per EMS, platform bed approx 1 foot from floor. 83 yo currently on eliquis and xarelto per paperwork for unclear reason w complaints of fall from bed, roll over and fell on the R side, now w R facial pain, L hand bruising, R shoulder pain. Pt noted w bruising to chest. Denies abd pain, or any other extremity injury. Per EMS, platform bed approx 1 foot from bed, but then after aide stated pt got up from bed quickly, witnessed and fell and hit edge of bed on R side, hurting face, no loc. ambulatory post event.

## 2022-09-04 NOTE — ED PROVIDER NOTE - ATTENDING APP SHARED VISIT CONTRIBUTION OF CARE
84yo F hx of pork allergy, ingested and now w/ epigastric pain, N/V/D. I discussed the plan of care of the patient directly with the PA while the patient was in the Emergency Department. VSS, ambulating well in the ED with minimal assistance, abd soft, no skin findings. I have reviewed the ACP note and agree with the history, exam and plan of care.

## 2022-09-04 NOTE — ED ADULT NURSE NOTE - NSIMPLEMENTINTERV_GEN_ALL_ED
Implemented All Fall with Harm Risk Interventions:  [place] to call system. Call bell, personal items and telephone within reach. Instruct patient to call for assistance. Room bathroom lighting operational. Non-slip footwear when patient is off stretcher. Physically safe environment: no spills, clutter or unnecessary equipment. Stretcher in lowest position, wheels locked, appropriate side rails in place. Provide visual cue, wrist band, yellow gown, etc. Monitor gait and stability. Monitor for mental status changes and reorient to person, place, and time. Review medications for side effects contributing to fall risk. Reinforce activity limits and safety measures with patient and family. Provide visual clues: red socks.

## 2022-09-04 NOTE — ED PROVIDER NOTE - CLINICAL SUMMARY MEDICAL DECISION MAKING FREE TEXT BOX
s/p fall/roll over on ac s/p fall from bed when pt stood up quickly hit R side of face, +facial injury, diffuse petechiae to chest noted, hds, (aide states at baseline currently on 2-3L at home), pt CTs completed and XR to eval for ich/visceral injury, negative, ambulating in ED, VSS, well appearing, aide states looks at baseline, advised reassessment of ac by PMD.

## 2022-09-04 NOTE — ED PROVIDER NOTE - PATIENT PORTAL LINK FT
You can access the FollowMyHealth Patient Portal offered by [facility] by registering at the following website: [email or web address]. By joining [employer]’s FollowMyHealth portal, you will also be able to view your health information using other applications (apps) compatible with our system.

## 2022-09-04 NOTE — ED ADULT NURSE REASSESSMENT NOTE - NS ED NURSE REASSESS COMMENT FT1
As per aide, aide wants to take patient home by cab. Patient ambulatory with steady gait with walker out of ER.
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
## 2022-09-05 DIAGNOSIS — R51.9 HEADACHE, UNSPECIFIED: ICD-10-CM

## 2022-09-05 DIAGNOSIS — Z20.822 CONTACT WITH AND (SUSPECTED) EXPOSURE TO COVID-19: ICD-10-CM

## 2022-09-05 DIAGNOSIS — S60.222A CONTUSION OF LEFT HAND, INITIAL ENCOUNTER: ICD-10-CM
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
## 2022-09-05 DIAGNOSIS — S00.83XA CONTUSION OF OTHER PART OF HEAD, INITIAL ENCOUNTER: ICD-10-CM

## 2022-09-05 DIAGNOSIS — Z79.01 LONG TERM (CURRENT) USE OF ANTICOAGULANTS: ICD-10-CM

## 2022-09-27 ENCOUNTER — APPOINTMENT (OUTPATIENT)
Dept: OTOLARYNGOLOGY | Facility: CLINIC | Age: 82
End: 2022-09-27

## 2022-09-27 VITALS
HEIGHT: 70 IN | WEIGHT: 200 LBS | RESPIRATION RATE: 16 BRPM | OXYGEN SATURATION: 96 % | TEMPERATURE: 97.7 F | BODY MASS INDEX: 28.63 KG/M2 | DIASTOLIC BLOOD PRESSURE: 81 MMHG | SYSTOLIC BLOOD PRESSURE: 136 MMHG | HEART RATE: 70 BPM

## 2022-09-27 DIAGNOSIS — H91.93 UNSPECIFIED HEARING LOSS, BILATERAL: ICD-10-CM

## 2022-09-27 DIAGNOSIS — C77.0 SECONDARY AND UNSPECIFIED MALIGNANT NEOPLASM OF LYMPH NODES OF HEAD, FACE AND NECK: ICD-10-CM

## 2022-09-27 DIAGNOSIS — J04.0 ACUTE LARYNGITIS: ICD-10-CM

## 2022-09-27 DIAGNOSIS — J32.0 CHRONIC MAXILLARY SINUSITIS: ICD-10-CM

## 2022-09-27 DIAGNOSIS — R68.89 OTHER GENERAL SYMPTOMS AND SIGNS: ICD-10-CM

## 2022-09-27 DIAGNOSIS — K21.9 ACUTE LARYNGITIS: ICD-10-CM

## 2022-09-27 DIAGNOSIS — J33.9 NASAL POLYP, UNSPECIFIED: ICD-10-CM

## 2022-09-27 PROCEDURE — 31575 DIAGNOSTIC LARYNGOSCOPY: CPT

## 2022-09-27 PROCEDURE — 99215 OFFICE O/P EST HI 40 MIN: CPT | Mod: 25

## 2022-09-27 NOTE — PROCEDURE
[Image(s) Captured] : image(s) captured and filed [Unable to Cooperate with Mirror] : patient unable to cooperate with mirror [Gag Reflex] : gag reflex preventing mirror examination [Hoarseness] : hoarseness not clearly evaluated by indirect laryngoscopy [Complicated Symptoms] : complicated symptoms requiring more thorough examination than provided by mirror [Topical Lidocaine] : topical lidocaine [Oxymetazoline HCl] : oxymetazoline HCl [Flexible Endoscope] : examined with the flexible endoscope [Serial Number: ___] : Serial Number: [unfilled] [de-identified] : The nasal septum is minimally deviated to the right. Interval right FESS.  All ostia appear patent without active drainage or polyps.  There are no masses or polyps and the nasal mucosa and secretions are normal. The choanae and posterior nasopharynx are normal without masses or drainage. The Eustachian tube orifices appear patent. The pharynx, including the posterior and lateral pharyngeal walls, the vallecula and base of tongue are normal without ulcerations, lesions or masses. The hypopharynx including the pyriform sinuses open well without pooling of secretions, mucosal lesions or masses. The supraglottic larynx including the epiglottis, petiole, arytenoids, glossoepiglottic, aryepiglottic and pharyngoepiglottic folds are normal without mucosal lesions, ulcerations or masses. The glottis reveals normal false vocal folds. The true vocal folds are glistening white, tense and of equal length, without paralysis, having symmetric mobility on adduction and abduction. There are no mucosal lesions, nodules, cysts, erythroplasia or leukoplakia. The posterior cricoid area has healthy pink mucosa in the interarytenoid area and esophageal inlet. There is mild thickening/edema of the interarytenoid mucosa suggestive of posterior laryngitis from laryngopharyngeal acid reflux disease.  Mucus is thick due to radiation in the past but not purulent. The trachea is clear without narrowing in the immediate subglottic region, without deviation or lesions. \par  [de-identified] : chronic cough/sinusitis, head and neck cancer

## 2022-09-27 NOTE — HISTORY OF PRESENT ILLNESS
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  He has remained DAVID since his treatment.  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  He has been monitored by Atilio Estrada at [facility].  His last PET CT scan was obtained approximately 2 years ago with no evidence of recurrence.  He gained 10 pounds over the last 2 years.  He has restless leg syndrome and spinal stenosis associated with insomnia. 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  He has been in rehabilitation at [facility] and is making good progress. Last weekend he developed some hoarseness, a slight wheeze in his chest and a productive cough with thick brown secretions. He started a Zithromax pack on Monday and he had a low-grade fever and he has since defervesced. The cough has also improved.  He had been using a cough syrup with codeine.  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 He was seen by an allergist and found not to have any allergies.  He also has been having poor sleep and increasing chronic fatigue. His restless leg syndrome is much better on homeopathic meds. He does not have sleep apnea.  He has now had two FESS procedures with both Dr. Dolan and Dr. Marcus but continues to have chronic sinus drainage despite daily BID saline irrigations with a bulb syringe.  He uses the Grosan irrigating device sporadically   He uses Robissin.  He had a pneumonia in the past treated for 7 days on oral antibiotics.  He has persistent complaints of chest congestion, PND, rhinorrhea, intermittent chronic cough and continued difficulty sleeping due to frequent throat clearing. He had seen a pulmonologist in the past (Leonel Vernon). He complains of dry itchy skin.  He has not been back to ENT for managing chronic sinusitis with nebulizers.  His voice is hoarse intermittently associated with throat clearing and a nocturnal nonproductive cough.  He had an silent MI and a stent placed this past February.  He denies fever chills or night sweats.  He denies weight loss and his appetite is good. His TFTs had been normal and monitored by his PCP.  A CT scan of the chest was obtained during his last ED visit last year and revealed two perifissural nodules in the left upper lobe measuring up to 5 mm.  He is not sure if this needs follow up and to see Leonel Vernon his pulmonologist.  Additional comments were made regarding 2 simple cysts in the liver. These are less than 1 cm each. There were multiple left rib fractures which were new compared to a CT scan from 2017. The lung nodules had been stable compared to 2017.  His most recent CT of the chest and abdomen was unremarkable with the exception of several small hypo dense nodules in the liver. The lung nodules were no longer identified. He is now off Coumadin and on Elaquis.

## 2022-09-27 NOTE — CONSULT LETTER
[Dear  ___] : Dear  [unfilled], [Consult Letter:] : I had the pleasure of evaluating your patient, [unfilled]. [Please see my note below.] : Please see my note below. [Consult Closing:] : Thank you very much for allowing me to participate in the care of this patient.  If you have any questions, please do not hesitate to contact me. [Sincerely,] : Sincerely, [DrYany  ___] : Dr. JOSHUA [FreeTextEntry3] : \par Flako Barnes M.D., FACS, ECNU\par Director Center for Thyroid & Parathyroid Surgery\par [facility]\par Certified in Thyroid/Parathyroid/Neck Ultrasound, ECNU/ AIUM\par \par , Department of Otolaryngology\par [facility] at [facility]\par

## 2022-11-02 ENCOUNTER — RESULT CHARGE (OUTPATIENT)
Age: 82
End: 2022-11-02

## 2022-11-02 ENCOUNTER — APPOINTMENT (OUTPATIENT)
Dept: INTERNAL MEDICINE | Facility: CLINIC | Age: 82
End: 2022-11-02

## 2022-11-03 ENCOUNTER — LABORATORY RESULT (OUTPATIENT)
Age: 82
End: 2022-11-03

## 2022-11-03 ENCOUNTER — NON-APPOINTMENT (OUTPATIENT)
Age: 82
End: 2022-11-03

## 2022-11-03 ENCOUNTER — APPOINTMENT (OUTPATIENT)
Dept: INTERNAL MEDICINE | Facility: CLINIC | Age: 82
End: 2022-11-03

## 2022-11-03 VITALS
BODY MASS INDEX: 29.35 KG/M2 | TEMPERATURE: 97.2 F | HEIGHT: 70 IN | OXYGEN SATURATION: 94 % | DIASTOLIC BLOOD PRESSURE: 81 MMHG | WEIGHT: 205 LBS | SYSTOLIC BLOOD PRESSURE: 135 MMHG | HEART RATE: 51 BPM

## 2022-11-03 DIAGNOSIS — Z86.711 PERSONAL HISTORY OF PULMONARY EMBOLISM: ICD-10-CM

## 2022-11-03 DIAGNOSIS — E78.5 HYPERLIPIDEMIA, UNSPECIFIED: ICD-10-CM

## 2022-11-03 DIAGNOSIS — Z23 ENCOUNTER FOR IMMUNIZATION: ICD-10-CM

## 2022-11-03 PROCEDURE — 90662 IIV NO PRSV INCREASED AG IM: CPT

## 2022-11-03 PROCEDURE — G0008: CPT

## 2022-11-03 PROCEDURE — 99205 OFFICE O/P NEW HI 60 MIN: CPT | Mod: 25,GC

## 2022-11-03 PROCEDURE — 36415 COLL VENOUS BLD VENIPUNCTURE: CPT

## 2022-11-03 PROCEDURE — 93000 ELECTROCARDIOGRAM COMPLETE: CPT

## 2022-11-10 LAB
ALBUMIN SERPL ELPH-MCNC: 4.4 G/DL
ALP BLD-CCNC: 52 U/L
ALT SERPL-CCNC: 20 U/L
ANION GAP SERPL CALC-SCNC: 11 MMOL/L
APTT BLD: 34.8 SEC
AST SERPL-CCNC: 21 U/L
AT III PPP CHRO-ACNC: 117 %
BASOPHILS # BLD AUTO: 0.03 K/UL
BASOPHILS NFR BLD AUTO: 0.7 %
BILIRUB SERPL-MCNC: 0.4 MG/DL
BUN SERPL-MCNC: 10 MG/DL
CALCIUM SERPL-MCNC: 9.1 MG/DL
CARDIOLIPIN AB SER IA-ACNC: NEGATIVE
CHLORIDE SERPL-SCNC: 103 MMOL/L
CHOLEST SERPL-MCNC: 159 MG/DL
CO2 SERPL-SCNC: 24 MMOL/L
CREAT SERPL-MCNC: 0.78 MG/DL
DNA PLOIDY SPEC FC-IMP: NORMAL
EGFR: 88 ML/MIN/1.73M2
EOSINOPHIL # BLD AUTO: 0.12 K/UL
EOSINOPHIL NFR BLD AUTO: 2.7 %
GLUCOSE SERPL-MCNC: 100 MG/DL
HCT VFR BLD CALC: 41.3 %
HDLC SERPL-MCNC: 60 MG/DL
HGB BLD-MCNC: 13.4 G/DL
IMM GRANULOCYTES NFR BLD AUTO: 0.4 %
INR PPP: 1.29 RATIO
LDLC SERPL CALC-MCNC: 76 MG/DL
LYMPHOCYTES # BLD AUTO: 0.92 K/UL
LYMPHOCYTES NFR BLD AUTO: 20.7 %
MAN DIFF?: NORMAL
MCHC RBC-ENTMCNC: 32.4 GM/DL
MCHC RBC-ENTMCNC: 33.3 PG
MCV RBC AUTO: 102.7 FL
MONOCYTES # BLD AUTO: 0.49 K/UL
MONOCYTES NFR BLD AUTO: 11 %
NEUTROPHILS # BLD AUTO: 2.87 K/UL
NEUTROPHILS NFR BLD AUTO: 64.5 %
NONHDLC SERPL-MCNC: 99 MG/DL
PLATELET # BLD AUTO: 254 K/UL
POTASSIUM SERPL-SCNC: 4.6 MMOL/L
PROT C PPP CHRO-ACNC: 124 %
PROT S AG ACT/NOR PPP IA: 84 %
PROT SERPL-MCNC: 6.7 G/DL
PT BLD: 15.1 SEC
RBC # BLD: 4.02 M/UL
RBC # FLD: 13.2 %
SODIUM SERPL-SCNC: 139 MMOL/L
TRIGL SERPL-MCNC: 111 MG/DL
TSH SERPL-ACNC: 2.87 UIU/ML
WBC # FLD AUTO: 4.45 K/UL

## 2022-11-15 ENCOUNTER — APPOINTMENT (OUTPATIENT)
Dept: INTERNAL MEDICINE | Facility: CLINIC | Age: 82
End: 2022-11-15

## 2022-11-15 VITALS
TEMPERATURE: 97 F | WEIGHT: 205 LBS | DIASTOLIC BLOOD PRESSURE: 89 MMHG | OXYGEN SATURATION: 95 % | HEART RATE: 70 BPM | SYSTOLIC BLOOD PRESSURE: 138 MMHG | RESPIRATION RATE: 16 BRPM | BODY MASS INDEX: 29.41 KG/M2

## 2022-11-15 PROCEDURE — 99214 OFFICE O/P EST MOD 30 MIN: CPT | Mod: GC

## 2022-11-15 RX ORDER — BUDESONIDE 0.5 MG/2ML
0.5 INHALANT ORAL TWICE DAILY
Qty: 180 | Refills: 4 | Status: DISCONTINUED | COMMUNITY
Start: 2017-10-12 | End: 2022-11-15

## 2022-11-16 NOTE — END OF VISIT
[FreeTextEntry3] : I saw and evaluated the patient.  The findings and assessment were discussed with the resident and I agree with resident’s plan as documented in the above, in the resident’s note.\par \par Pertinent exam findings: Well-appearing, NAD. \par \par SOB w/ wheezing: Likely COPD exacerbation.  No evidence of infection.  At baseline O2.  Prednisone, symbicort.  Pulm follow up\par Refractory itching: Refractory to anti-histamines.  Trial gabapentin for relief.\par MDD: SW referral for bridge to therapy.

## 2022-11-16 NOTE — ASSESSMENT
[FreeTextEntry1] : 82 y M with PMH HTN, anemia, hypothyroidism, bronchitis, CAD s/p stent, CVA (2014) presenting with ~1 week onset of worsening cough, sputum production, and CHRISTIAN, likely 2/2 COPD exacerbation

## 2022-11-16 NOTE — PLAN
[FreeTextEntry1] : #CHRISTIAN \par Likely 2/2 COPD exacerbation, currently with increased cough frequency and sputum production, with absence of fever, tachypnea, hypoxia. Currently only on albuterol and budesonide. \par -Prednisone 40 mg PO for 5 days \par -Stop budesonide inhaler and start Symbicort \par -Continue albuterol inhaler \par -Pulm appt on 11/29\par \par #Pruritis \par 2/2 seasonal allergies. Has trialed hydroxyzine and other meds in the past without relief\par -Start gabapentin 100 mg TID \par \par #MDD\par Currently endorses depressed mood despite being on Lexapro, Mirtazapine, Trazadone. States mood is controlled but would like to speak with a counselor. No active suicidal ideation currently \par -Psychiatrist referral placed\par - tasked to contact pt for resources for psychotherapists

## 2022-11-16 NOTE — REVIEW OF SYSTEMS
[Fever] : no fever [Chills] : no chills [Hot Flashes] : no hot flashes [Night Sweats] : no night sweats [Recent Change In Weight] : ~T no recent weight change [Suicidal] : not suicidal [Anxiety] : no anxiety

## 2022-11-16 NOTE — PHYSICAL EXAM
[de-identified] : decreased breath sounds, no wheezing  [de-identified] : LUE weakness from prior CVA

## 2022-11-16 NOTE — HISTORY OF PRESENT ILLNESS
[FreeTextEntry1] : Cough, CHRISTIAN [de-identified] : 82 y M with PMH anemia, hypothyroidism, bronchitis, CAD s/p stent, CVA (2014) presenting with ~1 week onset of weakness, increased cough frequency with increased sputum production (clear) despite using nebulizer treatments 2x day. Additionally pt states he has worsened SOB on exertion, stating he is even dyspneic speaking now. States last episode of PNA was January for which he was treated with antibiotics. Pt and HHA endorses wheezing however none observed today on exam. Has appt with pulmonology and cardiology in the upcoming month \par \par Additionally per pt and HHA, reports continued depressed mood, impaired sleep and concentration. Has lost follow up with counselor and would like to speak to someone and believe it would help. Denies active suicidal ideation. \par \par

## 2022-11-28 ENCOUNTER — NON-APPOINTMENT (OUTPATIENT)
Age: 82
End: 2022-11-28

## 2022-11-29 ENCOUNTER — APPOINTMENT (OUTPATIENT)
Dept: PULMONOLOGY | Facility: CLINIC | Age: 82
End: 2022-11-29

## 2022-11-29 VITALS
HEIGHT: 70 IN | SYSTOLIC BLOOD PRESSURE: 147 MMHG | HEART RATE: 73 BPM | TEMPERATURE: 97.1 F | DIASTOLIC BLOOD PRESSURE: 78 MMHG | OXYGEN SATURATION: 95 % | BODY MASS INDEX: 29.35 KG/M2 | WEIGHT: 205 LBS

## 2022-11-29 PROCEDURE — 71045 X-RAY EXAM CHEST 1 VIEW: CPT

## 2022-11-29 PROCEDURE — 94010 BREATHING CAPACITY TEST: CPT

## 2022-12-01 NOTE — PHYSICAL EXAM
[Normal Rate/Rhythm] : normal rate/rhythm [No Resp Distress] : no resp distress [Clear to Auscultation Bilaterally] : clear to auscultation bilaterally [No Focal Deficits] : no focal deficits [Oriented x3] : oriented x3 [Normal Affect] : normal affect [TextBox_2] : clear post surgical ent [TextBox_11] : distortion of oral pharynx [TextBox_44] : surgical findings

## 2022-12-01 NOTE — REASON FOR VISIT
[Initial] : an initial visit [TextBox_44] : patient with both a stroke and surgery for oral cancer with secretion issues

## 2022-12-01 NOTE — REVIEW OF SYSTEMS
[Cough] : cough [Frequency] : dysuria [Myalgias] : myalgias [Negative] : Hematologic [TextBox_3] : here with aide [TextBox_14] : status post surgery or oral cavity with problems with sweeping secretions [TextBox_30] : much better since steroids [TextBox_119] : some impariment from stroke

## 2022-12-01 NOTE — PROCEDURE
[FreeTextEntry1] : could not do spirometry well, but there appears to be no airflow obsttruction\par \par chest film with no clear infilltrates

## 2022-12-01 NOTE — DISCUSSION/SUMMARY
[FreeTextEntry1] : the steroid that were given by dr. escalera were effectgive\par \par i'm not sure he has any true lung diseae\par \par unfortunately, chronic aspiration will always be a problem as will secretions and this treatment may be needed again

## 2022-12-01 NOTE — HISTORY OF PRESENT ILLNESS
[TextBox_4] : surgery for neck cancer  years ago\par \par stroke  2014\par \par smoker stopped 25 years.\par \par had pneumonia several times\par \par prednisone  a few days  cleared things\par \par and also on symbicort\par \par likely the treatment with steroid has resolved things\par \par

## 2022-12-06 ENCOUNTER — APPOINTMENT (OUTPATIENT)
Dept: HEART AND VASCULAR | Facility: CLINIC | Age: 82
End: 2022-12-06

## 2022-12-06 ENCOUNTER — APPOINTMENT (OUTPATIENT)
Dept: INTERNAL MEDICINE | Facility: CLINIC | Age: 82
End: 2022-12-06

## 2022-12-06 ENCOUNTER — NON-APPOINTMENT (OUTPATIENT)
Age: 82
End: 2022-12-06

## 2022-12-06 VITALS
TEMPERATURE: 97.4 F | HEART RATE: 84 BPM | OXYGEN SATURATION: 97 % | DIASTOLIC BLOOD PRESSURE: 82 MMHG | BODY MASS INDEX: 29.92 KG/M2 | HEIGHT: 70 IN | WEIGHT: 209 LBS | SYSTOLIC BLOOD PRESSURE: 132 MMHG

## 2022-12-06 VITALS
OXYGEN SATURATION: 96 % | DIASTOLIC BLOOD PRESSURE: 82 MMHG | BODY MASS INDEX: 29.92 KG/M2 | HEIGHT: 70 IN | SYSTOLIC BLOOD PRESSURE: 124 MMHG | HEART RATE: 73 BPM | WEIGHT: 209 LBS | TEMPERATURE: 97.3 F

## 2022-12-06 DIAGNOSIS — Z79.01 LONG TERM (CURRENT) USE OF ANTICOAGULANTS: ICD-10-CM

## 2022-12-06 DIAGNOSIS — C09.9 MALIGNANT NEOPLASM OF TONSIL, UNSPECIFIED: ICD-10-CM

## 2022-12-06 DIAGNOSIS — Z00.00 ENCOUNTER FOR GENERAL ADULT MEDICAL EXAMINATION W/OUT ABNORMAL FINDINGS: ICD-10-CM

## 2022-12-06 DIAGNOSIS — J42 UNSPECIFIED CHRONIC BRONCHITIS: ICD-10-CM

## 2022-12-06 DIAGNOSIS — I47.20 VENTRICULAR TACHYCARDIA, UNSPECIFIED: ICD-10-CM

## 2022-12-06 PROCEDURE — 99203 OFFICE O/P NEW LOW 30 MIN: CPT

## 2022-12-06 PROCEDURE — 99214 OFFICE O/P EST MOD 30 MIN: CPT | Mod: GC

## 2022-12-06 PROCEDURE — 36415 COLL VENOUS BLD VENIPUNCTURE: CPT

## 2022-12-06 PROCEDURE — 93000 ELECTROCARDIOGRAM COMPLETE: CPT

## 2022-12-06 RX ORDER — BUDESONIDE 0.5 MG/2ML
0.5 INHALANT ORAL
Qty: 180 | Refills: 2 | Status: DISCONTINUED | COMMUNITY
Start: 2019-07-25 | End: 2022-12-06

## 2022-12-06 RX ORDER — FLUTICASONE PROPIONATE 50 UG/1
50 SPRAY, METERED NASAL TWICE DAILY
Qty: 1 | Refills: 2 | Status: DISCONTINUED | COMMUNITY
Start: 2018-11-08 | End: 2022-12-06

## 2022-12-06 RX ORDER — SULFAMETHOXAZOLE AND TRIMETHOPRIM 800; 160 MG/1; MG/1
800-160 TABLET ORAL TWICE DAILY
Qty: 20 | Refills: 0 | Status: DISCONTINUED | COMMUNITY
Start: 2018-11-13 | End: 2022-12-06

## 2022-12-06 RX ORDER — SULFAMETHOXAZOLE AND TRIMETHOPRIM 800; 160 MG/1; MG/1
800-160 TABLET ORAL TWICE DAILY
Qty: 60 | Refills: 1 | Status: DISCONTINUED | COMMUNITY
Start: 2019-06-20 | End: 2022-12-06

## 2022-12-06 NOTE — HISTORY OF PRESENT ILLNESS
[FreeTextEntry1] : 82 M HTN Anemia, Hypothyroid, Bronchiectasis, CAD s/p Stent [facility], CVA w/ L hemiparesis in 2014, L tonsillar SCC s/p RX and radical neck dissection in 2004 c/b fibrosis, hs/ DVT/PE in 2017 Former extensive smoking history walks with a walker\par \par \par \par here with his private AID who reports he was on multiple blood thinners including warfarin, DOAC, Plavixdoes not know why ? 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

## 2022-12-06 NOTE — ASSESSMENT
[FreeTextEntry1] : - was on AC unclear why aid will get records from [facility]\par - CAD he is on asa and atorvastatin 40 mg daily\par - repeat echocardiogram for PHTN given his lung history \par - 14 day holter monitor for AF\par - fu in three months\par - referred to private pay psych at aids request

## 2022-12-25 PROBLEM — I47.20 VENTRICULAR TACHYCARDIA: Status: ACTIVE | Noted: 2022-12-25

## 2022-12-29 ENCOUNTER — APPOINTMENT (OUTPATIENT)
Dept: HEART AND VASCULAR | Facility: CLINIC | Age: 82
End: 2022-12-29
Payer: MEDICARE

## 2022-12-29 VITALS — BODY MASS INDEX: 29.92 KG/M2 | WEIGHT: 209 LBS | HEIGHT: 70 IN

## 2022-12-29 PROCEDURE — 78452 HT MUSCLE IMAGE SPECT MULT: CPT

## 2022-12-29 PROCEDURE — ZZZZZ: CPT

## 2022-12-29 PROCEDURE — A9500: CPT

## 2022-12-29 PROCEDURE — 93015 CV STRESS TEST SUPVJ I&R: CPT

## 2022-12-29 PROCEDURE — 93306 TTE W/DOPPLER COMPLETE: CPT

## 2023-01-05 ENCOUNTER — APPOINTMENT (OUTPATIENT)
Dept: HEART AND VASCULAR | Facility: CLINIC | Age: 83
End: 2023-01-05

## 2023-01-09 ENCOUNTER — APPOINTMENT (OUTPATIENT)
Dept: INTERNAL MEDICINE | Facility: CLINIC | Age: 83
End: 2023-01-09
Payer: MEDICARE

## 2023-01-09 DIAGNOSIS — G89.29 DORSALGIA, UNSPECIFIED: ICD-10-CM

## 2023-01-09 DIAGNOSIS — L29.9 PRURITUS, UNSPECIFIED: ICD-10-CM

## 2023-01-09 DIAGNOSIS — M54.9 DORSALGIA, UNSPECIFIED: ICD-10-CM

## 2023-01-09 PROCEDURE — 99443: CPT | Mod: 95

## 2023-01-09 NOTE — END OF VISIT
[] : Resident [FreeTextEntry3] : \par 81 yo m w/h/o cad, cva here for back pain\par #renew PT for chronic back pain- \par #chronic pruritus- maybe releated to psych and can see derm to evaluate too\par \par  [Time Spent: ___ minutes] : I have spent [unfilled] minutes of time on the encounter.

## 2023-01-09 NOTE — HISTORY OF PRESENT ILLNESS
[Home] : at home, [unfilled] , at the time of the visit. [Medical Office: ([facility])___] : at the medical office located in  [Formal Caregiver] : formal caregiver [Verbal consent obtained from patient] : the patient, [unfilled] [FreeTextEntry1] : Renewal of referrals [de-identified] : Patient is an 81yo M w PMhx of anemia, hypothyroidism, bronchitis, CAD s/p stent, CVA (21014), COPD, and chronic microaspirations presenting for telephonic visit for renewal of referrals. Caregiver Lupe and patient speaking on phone, report need updated PT referral. In addition, persistent pruritus, aide reports vigorous itching causing blanching of skin. Otherwise reports needing lexapro renewal. Otherwise reports will continue to try tor each private psych contact that Dr. Wiley provided.  No other complaints.\par Denies fever, chills, chest pain, palpitations, cough, SOB, wheeze, abdominal pain, nausea, vomiting, diarrhea, dysuria, hematuria, or polyuria.

## 2023-01-09 NOTE — ASSESSMENT
[FreeTextEntry1] : Patient is an 81yo M w PMhx of anemia, hypothyroidism, bronchitis, CAD s/p stent, CVA (21014), COPD, and chronic microaspirations presenting for telephonic visit for renewal of referrals.\par \par #Chronic LBP\par - Will send PT referral\par \par #Pruritus\par - Reports persistent pruritus, minimal relief w Cromolyn sodium and hydroxyzine\par - Aide believes psych component to persistent itching\par - Dermatology referral sent\par \par #Depression\par - Aide in process of continuing to reach-out to private pay psychiatrist to establish care for patient\par - Lexapro renewal ordered.

## 2023-01-25 ENCOUNTER — EMERGENCY (EMERGENCY)
Facility: HOSPITAL | Age: 83
LOS: 1 days | Discharge: HOME | End: 2023-01-25
Attending: EMERGENCY MEDICINE | Admitting: EMERGENCY MEDICINE
Payer: MEDICARE

## 2023-01-25 VITALS
HEART RATE: 70 BPM | DIASTOLIC BLOOD PRESSURE: 81 MMHG | TEMPERATURE: 98 F | OXYGEN SATURATION: 95 % | SYSTOLIC BLOOD PRESSURE: 110 MMHG | RESPIRATION RATE: 18 BRPM

## 2023-01-25 VITALS
WEIGHT: 199.96 LBS | SYSTOLIC BLOOD PRESSURE: 148 MMHG | TEMPERATURE: 98 F | HEART RATE: 84 BPM | DIASTOLIC BLOOD PRESSURE: 89 MMHG | OXYGEN SATURATION: 94 % | RESPIRATION RATE: 16 BRPM

## 2023-01-25 LAB
ALBUMIN SERPL ELPH-MCNC: 4.2 G/DL — SIGNIFICANT CHANGE UP (ref 3.3–5)
ALP SERPL-CCNC: 60 U/L — SIGNIFICANT CHANGE UP (ref 40–120)
ALT FLD-CCNC: 21 U/L — SIGNIFICANT CHANGE UP (ref 10–45)
ANION GAP SERPL CALC-SCNC: 10 MMOL/L — SIGNIFICANT CHANGE UP (ref 5–17)
AST SERPL-CCNC: 23 U/L — SIGNIFICANT CHANGE UP (ref 10–40)
BASOPHILS # BLD AUTO: 0.05 K/UL — SIGNIFICANT CHANGE UP (ref 0–0.2)
BASOPHILS NFR BLD AUTO: 0.7 % — SIGNIFICANT CHANGE UP (ref 0–2)
BILIRUB SERPL-MCNC: 0.3 MG/DL — SIGNIFICANT CHANGE UP (ref 0.2–1.2)
BUN SERPL-MCNC: 8 MG/DL — SIGNIFICANT CHANGE UP (ref 7–23)
CALCIUM SERPL-MCNC: 9.4 MG/DL — SIGNIFICANT CHANGE UP (ref 8.4–10.5)
CHLORIDE SERPL-SCNC: 102 MMOL/L — SIGNIFICANT CHANGE UP (ref 96–108)
CO2 SERPL-SCNC: 27 MMOL/L — SIGNIFICANT CHANGE UP (ref 22–31)
CREAT SERPL-MCNC: 0.79 MG/DL — SIGNIFICANT CHANGE UP (ref 0.5–1.3)
EGFR: 88 ML/MIN/1.73M2 — SIGNIFICANT CHANGE UP
EOSINOPHIL # BLD AUTO: 0.21 K/UL — SIGNIFICANT CHANGE UP (ref 0–0.5)
EOSINOPHIL NFR BLD AUTO: 3 % — SIGNIFICANT CHANGE UP (ref 0–6)
FLUAV AG NPH QL: SIGNIFICANT CHANGE UP
FLUBV AG NPH QL: SIGNIFICANT CHANGE UP
GLUCOSE SERPL-MCNC: 110 MG/DL — HIGH (ref 70–99)
HCT VFR BLD CALC: 39.9 % — SIGNIFICANT CHANGE UP (ref 39–50)
HGB BLD-MCNC: 13.4 G/DL — SIGNIFICANT CHANGE UP (ref 13–17)
IMM GRANULOCYTES NFR BLD AUTO: 0.3 % — SIGNIFICANT CHANGE UP (ref 0–0.9)
LYMPHOCYTES # BLD AUTO: 0.58 K/UL — LOW (ref 1–3.3)
LYMPHOCYTES # BLD AUTO: 8.2 % — LOW (ref 13–44)
MCHC RBC-ENTMCNC: 33.3 PG — SIGNIFICANT CHANGE UP (ref 27–34)
MCHC RBC-ENTMCNC: 33.6 GM/DL — SIGNIFICANT CHANGE UP (ref 32–36)
MCV RBC AUTO: 99 FL — SIGNIFICANT CHANGE UP (ref 80–100)
MONOCYTES # BLD AUTO: 0.77 K/UL — SIGNIFICANT CHANGE UP (ref 0–0.9)
MONOCYTES NFR BLD AUTO: 10.9 % — SIGNIFICANT CHANGE UP (ref 2–14)
NEUTROPHILS # BLD AUTO: 5.44 K/UL — SIGNIFICANT CHANGE UP (ref 1.8–7.4)
NEUTROPHILS NFR BLD AUTO: 76.9 % — SIGNIFICANT CHANGE UP (ref 43–77)
NRBC # BLD: 0 /100 WBCS — SIGNIFICANT CHANGE UP (ref 0–0)
NT-PROBNP SERPL-SCNC: 99 PG/ML — SIGNIFICANT CHANGE UP (ref 0–300)
PLATELET # BLD AUTO: 234 K/UL — SIGNIFICANT CHANGE UP (ref 150–400)
POTASSIUM SERPL-MCNC: 4.4 MMOL/L — SIGNIFICANT CHANGE UP (ref 3.5–5.3)
POTASSIUM SERPL-SCNC: 4.4 MMOL/L — SIGNIFICANT CHANGE UP (ref 3.5–5.3)
PROT SERPL-MCNC: 7.2 G/DL — SIGNIFICANT CHANGE UP (ref 6–8.3)
RBC # BLD: 4.03 M/UL — LOW (ref 4.2–5.8)
RBC # FLD: 13 % — SIGNIFICANT CHANGE UP (ref 10.3–14.5)
RSV RNA NPH QL NAA+NON-PROBE: SIGNIFICANT CHANGE UP
SARS-COV-2 RNA SPEC QL NAA+PROBE: DETECTED
SODIUM SERPL-SCNC: 139 MMOL/L — SIGNIFICANT CHANGE UP (ref 135–145)
TROPONIN T SERPL-MCNC: <0.01 NG/ML — SIGNIFICANT CHANGE UP (ref 0–0.01)
WBC # BLD: 7.07 K/UL — SIGNIFICANT CHANGE UP (ref 3.8–10.5)
WBC # FLD AUTO: 7.07 K/UL — SIGNIFICANT CHANGE UP (ref 3.8–10.5)

## 2023-01-25 PROCEDURE — 99285 EMERGENCY DEPT VISIT HI MDM: CPT | Mod: FS,CS

## 2023-01-25 PROCEDURE — 85025 COMPLETE CBC W/AUTO DIFF WBC: CPT

## 2023-01-25 PROCEDURE — 99285 EMERGENCY DEPT VISIT HI MDM: CPT | Mod: 25

## 2023-01-25 PROCEDURE — 36415 COLL VENOUS BLD VENIPUNCTURE: CPT

## 2023-01-25 PROCEDURE — 83880 ASSAY OF NATRIURETIC PEPTIDE: CPT

## 2023-01-25 PROCEDURE — 87637 SARSCOV2&INF A&B&RSV AMP PRB: CPT

## 2023-01-25 PROCEDURE — 80053 COMPREHEN METABOLIC PANEL: CPT

## 2023-01-25 PROCEDURE — 71045 X-RAY EXAM CHEST 1 VIEW: CPT

## 2023-01-25 PROCEDURE — 84484 ASSAY OF TROPONIN QUANT: CPT

## 2023-01-25 PROCEDURE — 71045 X-RAY EXAM CHEST 1 VIEW: CPT | Mod: 26

## 2023-01-25 RX ORDER — MOMETASONE FUROATE 50 UG/1
2 SPRAY NASAL
Qty: 1 | Refills: 0
Start: 2023-01-25 | End: 2023-01-31

## 2023-01-25 NOTE — ED PROVIDER NOTE - NSFOLLOWUPINSTRUCTIONS_ED_ALL_ED_FT
COVID-19 (Coronavirus Disease 2019)  WHAT YOU NEED TO KNOW:  COVID-19 is the disease caused by a coronavirus first discovered in December 2019. Coronaviruses generally cause upper respiratory (nose, throat, and lung) infections, such as a cold. The 2019 virus spreads quickly and easily. It can be spread starting 2 to 3 days before symptoms even begin.  DISCHARGE INSTRUCTIONS:  Call your local emergency number (911 in the ) if:   •You have trouble breathing or shortness of breath at rest.  •You have chest pain or pressure that lasts longer than 5 minutes.  •You become confused or hard to wake.  •Your lips or face are blue.  Return to the emergency department if:   •You have a fever of 104°F (40°C) or higher  Call your doctor if:   •You have symptoms of COVID-19.  •You have questions or concerns about your condition or care.  Medicines: You may need any of the following:  •Decongestants help reduce nasal congestion and help you breathe more easily. If you take decongestant pills, they may make you feel restless or cause problems with your sleep. Do not use decongestant sprays for more than a few days.  •Cough suppressants help reduce coughing. Ask your healthcare provider which type of cough medicine is best for you.  •Acetaminophen decreases pain and fever. It is available without a doctor's order. Ask how much to take and how often to take it. Follow directions. Read the labels of all other medicines you are using to see if they also contain acetaminophen, or ask your doctor or pharmacist. Acetaminophen can cause liver damage if not taken correctly.  •NSAIDs, such as ibuprofen, help decrease swelling, pain, and fever. This medicine is available with or without a doctor's order. NSAIDs can cause stomach bleeding or kidney problems in certain people. If you take blood thinner medicine, always ask your healthcare provider if NSAIDs are safe for you. Always read the medicine label and follow directions.  •Blood thinners help prevent blood clots. Clots can cause strokes, heart attacks, and death. The following are general safety guidelines to follow while you are taking a blood thinner:?Watch for bleeding and bruising while you take blood thinners. Watch for bleeding from your gums or nose. Watch for blood in your urine and bowel movements. Use a soft washcloth on your skin, and a soft toothbrush to brush your teeth. This can keep your skin and gums from bleeding. If you shave, use an electric shaver. Do not play contact sports.   ?Tell your dentist and other healthcare providers that you take a blood thinner. Wear a bracelet or necklace that says you take this medicine.   ?Do not start or stop any other medicines unless your healthcare provider tells you to. Many medicines cannot be used with blood thinners.  ?Take your blood thinner exactly as prescribed by your healthcare provider. Do not skip does or take less than prescribed. Tell your provider right away if you forget to take your blood thinner, or if you take too much.  ?Warfarin is a blood thinner that you may need to take. The following are things you should be aware of if you take warfarin: ?Foods and medicines can affect the amount of warfarin in your blood. Do not make major changes to your diet while you take warfarin. Warfarin works best when you eat about the same amount of vitamin K every day. Vitamin K is found in green leafy vegetables and certain other foods. Ask for more information about what to eat when you are taking warfarin.  ?You will need to see your healthcare provider for follow-up visits when you are on warfarin. You will need regular blood tests. These tests are used to decide how much medicine you need.   •Take your medicine as directed. Contact your healthcare provider if you think your medicine is not helping or if you have side effects. Tell your provider if you are allergic to any medicine. Keep a list of the medicines, vitamins, and herbs you take. Include the amounts, and when and why you take them. Bring the list or the pill bottles to follow-up visits. Carry your medicine list with you in case of an emergency.  What you need to know about variants: The virus has changed into several new forms (called variants) since it was discovered. The variants may be more contagious (easily spread) than the original form. Some may also cause more severe illness than others.  What you need to know about COVID-19 vaccines: Healthcare providers recommend a COVID-19 vaccine, even if you have already had COVID-19. You are considered fully vaccinated against COVID-19 two weeks after the final dose of any COVID-19 vaccine. Let your healthcare provider know when you have received the final dose of the vaccine. Make a copy of your vaccination card. Keep the original with you in case you need to show it. Keep the copy in a safe place.  •Get a COVID-19 vaccine as directed. Vaccination is recommended for everyone 6 months or older. COVID-19 vaccines are given as a shot in 1 to 3 doses as a primary series. This depends on the vaccine brand and the age of the person who receives it. A booster dose is recommended for everyone 5 years or older after the primary series is complete. A second booster is recommended for all adults 50 or older and for immunocompromised adolescents. The second booster is also recommended for anyone who got the 1-dose brand of vaccine for the first dose and a booster. Your provider can give you more information on boosters and help you schedule all needed doses.  Recommended COVID-19 Immunization Schedule  •Continue social distancing and other measures. You can become infected even after you get the vaccine. You may also be able to pass the virus to others without knowing you are infected.  •After you get the vaccine, check local, national, and international travel rules. You may need to be tested before you travel. Some countries require proof of a negative test before you travel. You may also need to quarantine after you return.  •Medicine may be given to prevent infection. The medicine can be given if you are at high risk for infection and cannot get the vaccine. It can also be given if your immune system does not respond well to the vaccine.  How the 2019 coronavirus spreads:   •Droplets are the main way all coronaviruses spread. The virus travels in droplets that form when a person talks, sings, coughs, or sneezes. The droplets can also float in the air for minutes or hours. Infection happens when you breathe in the droplets or get them in your eyes or nose. Close personal contact with an infected person increases your risk for infection. This means being within 6 feet (2 meters) of the person for at least 15 minutes over 24 hours.  •Person-to-person contact can spread the virus. For example, a person with the virus on his or her hands can spread it by shaking hands with someone.  •The virus can stay on objects and surfaces for up to 3 days. You may become infected by touching the object or surface and then touching your eyes or mouth.  Help lower the risk for COVID-19:   •Wash your hands often throughout the day. Use soap and water. Rub your soapy hands together, lacing your fingers, for at least 20 seconds. Rinse with warm, running water. Dry your hands with a clean towel or paper towel. Use hand  that contains alcohol if soap and water are not available. Teach children how to wash their hands and use hand .  Handwashing  •Cover sneezes and coughs. Turn your face away and cover your mouth and nose with a tissue. Throw the tissue away. Use the bend of your arm if a tissue is not available. Then wash your hands well with soap and water or use hand . Teach children how to cover a cough or sneeze.  •Wear a face covering (mask) when needed. Use a cloth covering with at least 2 layers. You can also create layers by putting a cloth covering over a disposable non-medical mask. Cover your mouth and your nose.  How to Wear a Face Covering (Mask)  •Follow worldwide, national, and local social distancing guidelines. Keep at least 6 feet (2 meters) between you and others.  •Try not to touch your face. If you get the virus on your hands, you can transfer it to your eyes, nose, or mouth and become infected. You can also transfer it to objects, surfaces, or people.  •Clean and disinfect high-touch surfaces and objects often. Use disinfecting wipes, or make a solution of 4 teaspoons of bleach in 1 quart (4 cups) of water.  •Ask about other vaccines you may need. Get the influenza (flu) vaccine as soon as recommended each year, usually starting in September or October. Get the pneumonia vaccine if recommended. Your healthcare provider can tell you if you should also get other vaccines, and when to get them.  Prevent COVID-19 Infection  Follow social distancing guidelines: National and local social distancing rules vary. Rules and restrictions may change over time as restrictions are lifted. The following are general guidelines:  •Stay home if you are sick or think you may have COVID-19. It is important to stay home if you are waiting for a testing appointment or for test results.  •Avoid close physical contact with anyone who does not live in your home. Do not shake hands with, hug, or kiss a person as a greeting. If you must use public transportation (such as a bus or subway), try to sit or stand away from others. Wear your face covering.  •Avoid in-person gatherings and crowds. Attend virtually if possible.  Follow up with your doctor as directed: Write down your questions so you remember to ask them during your visits.  For more information:   •Centers for Disease Control and Prevention  [address] [place] [address]  Phone: 1-452.278.1692  Web Address: http://www.cdc.gov

## 2023-01-25 NOTE — ED PROVIDER NOTE - PATIENT PORTAL LINK FT
You can access the FollowMyHealth Patient Portal offered by [facility] by registering at the following website: [email or web address]. By joining [email or web address]’s FollowMyHealth portal, you will also be able to view your health information using other applications (apps) compatible with our system.

## 2023-01-25 NOTE — ED PROVIDER NOTE - OBJECTIVE STATEMENT
The pt is a 83 y/o M, who presents to ED w/HHA, c/o "cold" x 1 wk. Pt c/o nasal congestion (hx of allergic rhinitis), cough (hx of copd), has been using his MDI and nebs w/good symptom control. Denies fevers, chills, cp, sob, n/v/d, abd pain, dizziness, syncope.

## 2023-01-25 NOTE — ED ADULT NURSE NOTE - OBJECTIVE STATEMENT
SOB and nasal congestion x 3 days, denies chest pain. Cardiax hx and hx of COPD. Able to speak in complete sentences, AAOX4, NAD.

## 2023-01-25 NOTE — ED PROVIDER NOTE - ENMT, MLM
Airway patent, + Nasal congestion, no frontal or maxillary sinus tend b/l, + post nasal drip. Mouth with normal mucosa. Throat has no vesicles, no oropharyngeal exudates and uvula is midline. no cervical lymphadenopathy b/l

## 2023-01-25 NOTE — ED PROVIDER NOTE - ATTENDING APP SHARED VISIT CONTRIBUTION OF CARE
83 y/o M c/o 1 wk uri sx w congestion, dry cough w/o sob usually relieved w mdi/nebs.  No fever, cp, sob Patient likely has a viral Upper respiratory infection; no evidence to suggest sepsis or meningitis;  The patient is non toxic appearing with no focal lung findings and acceptable oxygenation.  No increased wob or resp distress.  Plan labs, cxr.   Reassess.

## 2023-01-25 NOTE — ED PROVIDER NOTE - CLINICAL SUMMARY MEDICAL DECISION MAKING FREE TEXT BOX
pt c/o cold symptoms x 1wk, main c/o is nasal congestion, + dry cough (unchanged from baseline - hx of copd), afebrile and no fevers at home, no resp distress, labs wnl, +covid, cxr wnl. does not meet admission criteria. stable for dc, will rx nasal spray and to con't nebs/mdi use, out of window for paxlovid, f/u w/pmd, pt understands and agrees w/plan, strict return precautions given, HHA aware and comfortable w/plan

## 2023-01-27 DIAGNOSIS — R09.82 POSTNASAL DRIP: ICD-10-CM

## 2023-01-27 DIAGNOSIS — J44.9 CHRONIC OBSTRUCTIVE PULMONARY DISEASE, UNSPECIFIED: ICD-10-CM

## 2023-01-27 DIAGNOSIS — R09.81 NASAL CONGESTION: ICD-10-CM

## 2023-01-27 DIAGNOSIS — U07.1 COVID-19: ICD-10-CM

## 2023-02-02 ENCOUNTER — APPOINTMENT (OUTPATIENT)
Dept: INTERNAL MEDICINE | Facility: CLINIC | Age: 83
End: 2023-02-02
Payer: MEDICARE

## 2023-02-02 ENCOUNTER — NON-APPOINTMENT (OUTPATIENT)
Age: 83
End: 2023-02-02

## 2023-02-02 DIAGNOSIS — U07.1 COVID-19: ICD-10-CM

## 2023-02-02 PROCEDURE — 99442: CPT | Mod: CS,GC,95

## 2023-02-03 ENCOUNTER — EMERGENCY (EMERGENCY)
Facility: HOSPITAL | Age: 83
LOS: 1 days | Discharge: HOME | End: 2023-02-03
Attending: STUDENT IN AN ORGANIZED HEALTH CARE EDUCATION/TRAINING PROGRAM | Admitting: STUDENT IN AN ORGANIZED HEALTH CARE EDUCATION/TRAINING PROGRAM
Payer: MEDICARE

## 2023-02-03 VITALS
RESPIRATION RATE: 18 BRPM | TEMPERATURE: 98 F | DIASTOLIC BLOOD PRESSURE: 58 MMHG | OXYGEN SATURATION: 93 % | HEART RATE: 70 BPM | SYSTOLIC BLOOD PRESSURE: 101 MMHG

## 2023-02-03 VITALS
WEIGHT: 199.96 LBS | TEMPERATURE: 98 F | HEART RATE: 108 BPM | HEIGHT: 72 IN | SYSTOLIC BLOOD PRESSURE: 151 MMHG | RESPIRATION RATE: 20 BRPM | DIASTOLIC BLOOD PRESSURE: 99 MMHG | OXYGEN SATURATION: 94 %

## 2023-02-03 PROBLEM — U07.1 COVID-19: Status: ACTIVE | Noted: 2023-02-02

## 2023-02-03 LAB
ALBUMIN SERPL ELPH-MCNC: 4.3 G/DL — SIGNIFICANT CHANGE UP (ref 3.3–5)
ALP SERPL-CCNC: 58 U/L — SIGNIFICANT CHANGE UP (ref 40–120)
ALT FLD-CCNC: 24 U/L — SIGNIFICANT CHANGE UP (ref 10–45)
ANION GAP SERPL CALC-SCNC: 14 MMOL/L — SIGNIFICANT CHANGE UP (ref 5–17)
AST SERPL-CCNC: 24 U/L — SIGNIFICANT CHANGE UP (ref 10–40)
BASOPHILS # BLD AUTO: 0.01 K/UL — SIGNIFICANT CHANGE UP (ref 0–0.2)
BASOPHILS NFR BLD AUTO: 0.1 % — SIGNIFICANT CHANGE UP (ref 0–2)
BILIRUB SERPL-MCNC: 0.3 MG/DL — SIGNIFICANT CHANGE UP (ref 0.2–1.2)
BUN SERPL-MCNC: 21 MG/DL — SIGNIFICANT CHANGE UP (ref 7–23)
CALCIUM SERPL-MCNC: 9 MG/DL — SIGNIFICANT CHANGE UP (ref 8.4–10.5)
CHLORIDE SERPL-SCNC: 104 MMOL/L — SIGNIFICANT CHANGE UP (ref 96–108)
CO2 SERPL-SCNC: 22 MMOL/L — SIGNIFICANT CHANGE UP (ref 22–31)
CREAT SERPL-MCNC: 0.92 MG/DL — SIGNIFICANT CHANGE UP (ref 0.5–1.3)
EGFR: 82 ML/MIN/1.73M2 — SIGNIFICANT CHANGE UP
EOSINOPHIL # BLD AUTO: 0.01 K/UL — SIGNIFICANT CHANGE UP (ref 0–0.5)
EOSINOPHIL NFR BLD AUTO: 0.1 % — SIGNIFICANT CHANGE UP (ref 0–6)
FLUAV AG NPH QL: SIGNIFICANT CHANGE UP
FLUBV AG NPH QL: SIGNIFICANT CHANGE UP
GLUCOSE SERPL-MCNC: 130 MG/DL — HIGH (ref 70–99)
HCT VFR BLD CALC: 38.9 % — LOW (ref 39–50)
HGB BLD-MCNC: 13.1 G/DL — SIGNIFICANT CHANGE UP (ref 13–17)
IMM GRANULOCYTES NFR BLD AUTO: 0.9 % — SIGNIFICANT CHANGE UP (ref 0–0.9)
LYMPHOCYTES # BLD AUTO: 0.71 K/UL — LOW (ref 1–3.3)
LYMPHOCYTES # BLD AUTO: 10.5 % — LOW (ref 13–44)
MCHC RBC-ENTMCNC: 33.7 GM/DL — SIGNIFICANT CHANGE UP (ref 32–36)
MCHC RBC-ENTMCNC: 33.8 PG — SIGNIFICANT CHANGE UP (ref 27–34)
MCV RBC AUTO: 100.3 FL — HIGH (ref 80–100)
MONOCYTES # BLD AUTO: 0.46 K/UL — SIGNIFICANT CHANGE UP (ref 0–0.9)
MONOCYTES NFR BLD AUTO: 6.8 % — SIGNIFICANT CHANGE UP (ref 2–14)
NEUTROPHILS # BLD AUTO: 5.49 K/UL — SIGNIFICANT CHANGE UP (ref 1.8–7.4)
NEUTROPHILS NFR BLD AUTO: 81.6 % — HIGH (ref 43–77)
NRBC # BLD: 0 /100 WBCS — SIGNIFICANT CHANGE UP (ref 0–0)
NT-PROBNP SERPL-SCNC: 147 PG/ML — SIGNIFICANT CHANGE UP (ref 0–300)
PLATELET # BLD AUTO: 310 K/UL — SIGNIFICANT CHANGE UP (ref 150–400)
POTASSIUM SERPL-MCNC: 4.1 MMOL/L — SIGNIFICANT CHANGE UP (ref 3.5–5.3)
POTASSIUM SERPL-SCNC: 4.1 MMOL/L — SIGNIFICANT CHANGE UP (ref 3.5–5.3)
PROT SERPL-MCNC: 7.1 G/DL — SIGNIFICANT CHANGE UP (ref 6–8.3)
RBC # BLD: 3.88 M/UL — LOW (ref 4.2–5.8)
RBC # FLD: 12.9 % — SIGNIFICANT CHANGE UP (ref 10.3–14.5)
RSV RNA NPH QL NAA+NON-PROBE: SIGNIFICANT CHANGE UP
SARS-COV-2 RNA SPEC QL NAA+PROBE: DETECTED
SODIUM SERPL-SCNC: 140 MMOL/L — SIGNIFICANT CHANGE UP (ref 135–145)
TROPONIN T SERPL-MCNC: 0.01 NG/ML — SIGNIFICANT CHANGE UP (ref 0–0.01)
WBC # BLD: 6.74 K/UL — SIGNIFICANT CHANGE UP (ref 3.8–10.5)
WBC # FLD AUTO: 6.74 K/UL — SIGNIFICANT CHANGE UP (ref 3.8–10.5)

## 2023-02-03 PROCEDURE — 80053 COMPREHEN METABOLIC PANEL: CPT

## 2023-02-03 PROCEDURE — 84484 ASSAY OF TROPONIN QUANT: CPT

## 2023-02-03 PROCEDURE — 85025 COMPLETE CBC W/AUTO DIFF WBC: CPT

## 2023-02-03 PROCEDURE — 36415 COLL VENOUS BLD VENIPUNCTURE: CPT

## 2023-02-03 PROCEDURE — 87637 SARSCOV2&INF A&B&RSV AMP PRB: CPT

## 2023-02-03 PROCEDURE — 71045 X-RAY EXAM CHEST 1 VIEW: CPT | Mod: 26

## 2023-02-03 PROCEDURE — 71045 X-RAY EXAM CHEST 1 VIEW: CPT

## 2023-02-03 PROCEDURE — 99285 EMERGENCY DEPT VISIT HI MDM: CPT | Mod: 25

## 2023-02-03 PROCEDURE — 94640 AIRWAY INHALATION TREATMENT: CPT

## 2023-02-03 PROCEDURE — 99285 EMERGENCY DEPT VISIT HI MDM: CPT | Mod: CS

## 2023-02-03 PROCEDURE — 83880 ASSAY OF NATRIURETIC PEPTIDE: CPT

## 2023-02-03 PROCEDURE — 93005 ELECTROCARDIOGRAM TRACING: CPT

## 2023-02-03 RX ORDER — PHENYLEPHRINE-SHARK LIVER OIL-MINERAL OIL-PETROLATUM RECTAL OINTMENT
1 OINTMENT (GRAM) RECTAL ONCE
Refills: 0 | Status: COMPLETED | OUTPATIENT
Start: 2023-02-03 | End: 2023-02-03

## 2023-02-03 RX ORDER — IPRATROPIUM/ALBUTEROL SULFATE 18-103MCG
3 AEROSOL WITH ADAPTER (GRAM) INHALATION ONCE
Refills: 0 | Status: COMPLETED | OUTPATIENT
Start: 2023-02-03 | End: 2023-02-03

## 2023-02-03 RX ADMIN — Medication 3 MILLILITER(S): at 20:19

## 2023-02-03 RX ADMIN — PHENYLEPHRINE-SHARK LIVER OIL-MINERAL OIL-PETROLATUM RECTAL OINTMENT 1 SUPPOSITORY(S): at 22:05

## 2023-02-03 NOTE — ED PROVIDER NOTE - CLINICAL SUMMARY MEDICAL DECISION MAKING FREE TEXT BOX
COVID positive, COPD history, here with cough and mild SOB. Pt took nebs at home, here w sinus tachycadia, improving. Normal O2 sat w/o O2. Pt on home O2 PRN.  Will perform basic labs and CXR r/o PNA. Low susp of PE. Low susp of ACS, EKG non-ischemic, trop neg.  Will give neb for likely coinciding COPD exacerbation.  Pt likely to be dc home.

## 2023-02-03 NOTE — ED PROVIDER NOTE - PATIENT PORTAL LINK FT
You can access the FollowMyHealth Patient Portal offered by [facility] by registering at the following website: [email or web address]. By joining [employer]’s FollowMyHealth portal, you will also be able to view your health information using other applications (apps) compatible with our system.

## 2023-02-03 NOTE — ED PROVIDER NOTE - PROGRESS NOTE DETAILS
CXR neg for new consolidation, low susp of PNA. Pt states he feels better s/p treatment.  Pt has no s/s of severe disease, no signs of PE. Pt has O2 sat 93% on RA w ambulation. Afebrile and well-appearing in ED. CXR neg for consolidation.  Pt understands COVID19 diagnosis and needs to practice cautious social isolation. Pt advised that upon discharge, will need to continue to monitor sx and return to ED for any SOB, CP, vision changes, neck pain, back pain, or worsening sx. Pt verbalizes understanding.    Pt is ready for discharge and safe discharge has been established. Pt was treated for COVID-19 infection. Does not require admission at this time. Will d/c with outpatient follow-up.

## 2023-02-03 NOTE — ED PROVIDER NOTE - OBJECTIVE STATEMENT
81 yo M w PMH of COPD, recently tested covid positive, on Paxlovid, s/p vax x5, p/w cough for 5 days, mild shortness of breath. Patient is on 2 L NC oxygen at home as needed, O2 sat typically 90%. No fever at home. No leg swelling, hemoptysis, exogenous estrogen, recent travel, surgery, malignancy, personal or FHx of VTE.

## 2023-02-03 NOTE — ED PROVIDER NOTE - NS ED ROS FT
CONSTITUTIONAL: No fever, no chills, no fatigue  EYES: No eye redness, no visual changes  ENT: No ear pain, no sore throat  CARDIOVASCULAR: No chest pain, no palpitations  RESPIRATORY: +cough, + SOB  GI: No abdominal pain, no nausea, no vomiting, no constipation, no diarrhea  GENITOURINARY: No dysuria, no frequency, no hematuria  MUSCULOSKELETAL: No back pain, no joint pain, no myalgias  SKIN: No rash, no peripheral edema  NEURO: No headache, no confusion    ALL OTHER SYSTEMS NEGATIVE.

## 2023-02-03 NOTE — ED ADULT TRIAGE NOTE - CHIEF COMPLAINT QUOTE
BIBA c.o SOB x 1 week. SOB worsened with week. + covid, started on paxlovid x 2 days ago. PMH of COPD

## 2023-02-03 NOTE — ED PROVIDER NOTE - NSFOLLOWUPINSTRUCTIONS_ED_ALL_ED_FT
Follow up with your primary medical doctor as soon as possible.    Return to the emergency department if your symptoms worsen or if you develop new symptoms.  If you have any problems with followup, please call the ED Referral Coordinator at [phone].      COVID-19 (Coronavirus Disease 2019)    WHAT YOU NEED TO KNOW:    COVID-19 is the disease caused by a coronavirus first discovered in December 2019. Coronaviruses generally cause upper respiratory (nose, throat, and lung) infections, such as a cold. The 2019 virus spreads quickly and easily. It can be spread starting 2 to 3 days before symptoms even begin.    DISCHARGE INSTRUCTIONS:    Call your local emergency number (911 in the ) if:   •You have trouble breathing or shortness of breath at rest.      •You have chest pain or pressure that lasts longer than 5 minutes.      •You become confused or hard to wake.      •Your lips or face are blue.      Seek care immediately if:   •You have a fever of 104°F (40°C) or higher.          Call your doctor if:   •You have symptoms of COVID-19.      •You have questions or concerns about your condition or care.      Medicines: You may need any of the following:  •Decongestants help reduce nasal congestion and help you breathe more easily. If you take decongestant pills, they may make you feel restless or cause problems with your sleep. Do not use decongestant sprays for more than a few days.      •Cough suppressants help reduce coughing. Ask your healthcare provider which type of cough medicine is best for you.      •Acetaminophen decreases pain and fever. It is available without a doctor's order. Ask how much to take and how often to take it. Follow directions. Read the labels of all other medicines you are using to see if they also contain acetaminophen, or ask your doctor or pharmacist. Acetaminophen can cause liver damage if not taken correctly.      •NSAIDs, such as ibuprofen, help decrease swelling, pain, and fever. This medicine is available with or without a doctor's order. NSAIDs can cause stomach bleeding or kidney problems in certain people. If you take blood thinner medicine, always ask your healthcare provider if NSAIDs are safe for you. Always read the medicine label and follow directions.      •Blood thinners help prevent blood clots. Clots can cause strokes, heart attacks, and death. The following are general safety guidelines to follow while you are taking a blood thinner:?Watch for bleeding and bruising while you take blood thinners. Watch for bleeding from your gums or nose. Watch for blood in your urine and bowel movements. Use a soft washcloth on your skin, and a soft toothbrush to brush your teeth. This can keep your skin and gums from bleeding. If you shave, use an electric shaver. Do not play contact sports.       ?Tell your dentist and other healthcare providers that you take a blood thinner. Wear a bracelet or necklace that says you take this medicine.       ?Do not start or stop any other medicines unless your healthcare provider tells you to. Many medicines cannot be used with blood thinners.      ?Take your blood thinner exactly as prescribed by your healthcare provider. Do not skip does or take less than prescribed. Tell your provider right away if you forget to take your blood thinner, or if you take too much.      ?Warfarin is a blood thinner that you may need to take. The following are things you should be aware of if you take warfarin: ?Foods and medicines can affect the amount of warfarin in your blood. Do not make major changes to your diet while you take warfarin. Warfarin works best when you eat about the same amount of vitamin K every day. Vitamin K is found in green leafy vegetables and certain other foods. Ask for more information about what to eat when you are taking warfarin.      ?You will need to see your healthcare provider for follow-up visits when you are on warfarin. You will need regular blood tests. These tests are used to decide how much medicine you need.         •Take your medicine as directed. Contact your healthcare provider if you think your medicine is not helping or if you have side effects. Tell him or her if you are allergic to any medicine. Keep a list of the medicines, vitamins, and herbs you take. Include the amounts, and when and why you take them. Bring the list or the pill bottles to follow-up visits. Carry your medicine list with you in case of an emergency.      What you need to know about variants: The virus has changed into several new forms (called variants) since it was discovered. The variants may be more contagious (easily spread) than the original form. Some may also cause more severe illness than others.    What you need to know about COVID-19 vaccines: Healthcare providers recommend a COVID-19 vaccine, even if you have already had COVID-19. You are considered fully vaccinated against COVID-19 two weeks after the final dose of any COVID-19 vaccine. Let your healthcare provider know when you have received the final dose of the vaccine. Make a copy of your vaccination card. Keep the original with you in case you need to show it. Keep the copy in a safe place.  •Get a COVID-19 vaccine as directed. The vaccines help prevent severe COVID-19 illness. Vaccination is recommended for everyone 5 years or older. COVID-19 vaccines are given as a shot, usually in 1 or 2 doses. This depends on the age of the person receiving it. A booster dose is recommended for everyone 5 years or older. A second booster is recommended for all adults 50 or older and for immunocompromised adolescents. The second booster is also recommended for anyone who got the 1-dose brand of vaccine for the first dose and a booster. Your provider can give you more information on boosters. He or she can help you schedule all needed doses.  COVID-19 Immunization Schedule           •Continue social distancing and other measures. You can become infected even after you get the vaccine. You may also be able to pass the virus to others without knowing you are infected.      •After you get the vaccine, check local, national, and international travel rules. You may need to be tested before you travel. Some countries require proof of a negative test before you travel. You may also need to quarantine after you return.      •Medicine may be given to prevent infection. The medicine can be given if you are at high risk for infection and cannot get the vaccine. It can also be given if your immune system does not respond well to the vaccine.      How the 2019 coronavirus spreads:   •Droplets are the main way all coronaviruses spread. The virus travels in droplets that form when a person talks, sings, coughs, or sneezes. The droplets can also float in the air for minutes or hours. Infection happens when you breathe in the droplets or get them in your eyes or nose. Close personal contact with an infected person increases your risk for infection. This means being within 6 feet (2 meters) of the person for at least 15 minutes over 24 hours.      •Person-to-person contact can spread the virus. For example, a person with the virus on his or her hands can spread it by shaking hands with someone.      •The virus can stay on objects and surfaces for up to 3 days. You may become infected by touching the object or surface and then touching your eyes or mouth.      Help lower the risk for COVID-19:   •Wash your hands often throughout the day. Use soap and water. Rub your soapy hands together, lacing your fingers, for at least 20 seconds. Rinse with warm, running water. Dry your hands with a clean towel or paper towel. Use hand  that contains alcohol if soap and water are not available. Teach children how to wash their hands and use hand .  Handwashing           •Cover sneezes and coughs. Turn your face away and cover your mouth and nose with a tissue. Throw the tissue away. Use the bend of your arm if a tissue is not available. Then wash your hands well with soap and water or use hand . Teach children how to cover a cough or sneeze.      •Wear a face covering (mask) when needed. Use a cloth covering with at least 2 layers. You can also create layers by putting a cloth covering over a disposable non-medical mask. Cover your mouth and your nose.  How to Wear a Face Covering (Mask)           •Follow worldwide, national, and local social distancing guidelines. Keep at least 6 feet (2 meters) between you and others.      •Try not to touch your face. If you get the virus on your hands, you can transfer it to your eyes, nose, or mouth and become infected. You can also transfer it to objects, surfaces, or people.      •Clean and disinfect high-touch surfaces and objects often. Use disinfecting wipes, or make a solution of 4 teaspoons of bleach in 1 quart (4 cups) of water.      •Ask about other vaccines you may need. Get the influenza (flu) vaccine as soon as recommended each year, usually starting in September or October. Get the pneumonia vaccine if recommended. Your healthcare provider can tell you if you should also get other vaccines, and when to get them.    Prevent COVID-19 Infection         Follow social distancing guidelines: National and local social distancing rules vary. Rules and restrictions may change over time as restrictions are lifted. The following are general guidelines:  •Stay home if you are sick or think you may have COVID-19. It is important to stay home if you are waiting for a testing appointment or for test results.      •Avoid close physical contact with anyone who does not live in your home. Do not shake hands with, hug, or kiss a person as a greeting. If you must use public transportation (such as a bus or subway), try to sit or stand away from others. Wear your face covering.      •Avoid in-person gatherings and crowds. Attend virtually if possible.      Follow up with your doctor as directed: Write down your questions so you remember to ask them during your visits.    For more information:   •Centers for Disease Control and Prevention  [address] [place] [address]  Phone: 1-182.627.9379  Web Address: http://www.cdc.gov      Chronic Obstructive Pulmonary Disease    Chronic obstructive pulmonary disease (COPD) is a lung condition in which airflow from the lungs is limited. Causes include smoking, secondhand smoke exposure, genetics, or recurrent infections. Take all medicines (inhaled or pills) as directed by your health care provider. Avoid exposure to irritants such as smoke, chemicals, and fumes that aggravate your breathing.    If you are a smoker, the most important thing that you can do is stop smoking. Continuing to smoke will cause further lung damage and breathing trouble. Ask your health care provider for help with quitting smoking.    SEEK IMMEDIATE MEDICAL CARE IF YOU HAVE ANY OF THE FOLLOWING SYMPTOMS: shortness of breath at rest or when talking, bluish discoloration of lips, skin, fever, worsening cough, unexplained chest pain, or lightheadedness/dizziness.

## 2023-02-03 NOTE — ED ADULT NURSE REASSESSMENT NOTE - NS ED NURSE REASSESS COMMENT FT1
Pt received resting comfortably on stretcher, VSS, neb treatment ordered and given. Safety precautions in place, will continue to monitor.

## 2023-02-03 NOTE — ED ADULT NURSE NOTE - OBJECTIVE STATEMENT
Worsening SOB x 1 week, dx with COVID and started on paxlovid x 2 days ago. Wears 2 L O2 at baseline for COPD per patient. Cough present, on monitor, AAOX4, NAD.

## 2023-02-03 NOTE — ED PROVIDER NOTE - PHYSICAL EXAMINATION
CONSTITUTIONAL: Non-toxic; in no apparent distress  HEAD: Normocephalic; atraumatic  EYES: PERRL; EOM intact   ENMT: External appears normal  NECK: Supple; non-tender  CARD: Normal S1, S2; no murmurs, rubs, or gallops  RESP: Normal chest excursion with respiration; + mild exp wheeze bl, breath sounds equal bilaterally  ABD: Soft, non-distended; non-tender  EXT: Normal ROM in all four extremities; non-tender to palpation  SKIN: Warm, dry, no rash  NEURO:  No focal neurological deficiencies.

## 2023-02-06 DIAGNOSIS — Z99.81 DEPENDENCE ON SUPPLEMENTAL OXYGEN: ICD-10-CM

## 2023-02-06 DIAGNOSIS — Z87.891 PERSONAL HISTORY OF NICOTINE DEPENDENCE: ICD-10-CM

## 2023-02-06 DIAGNOSIS — J44.9 CHRONIC OBSTRUCTIVE PULMONARY DISEASE, UNSPECIFIED: ICD-10-CM

## 2023-02-06 DIAGNOSIS — R00.0 TACHYCARDIA, UNSPECIFIED: ICD-10-CM

## 2023-02-06 DIAGNOSIS — R06.02 SHORTNESS OF BREATH: ICD-10-CM

## 2023-02-06 DIAGNOSIS — U07.1 COVID-19: ICD-10-CM

## 2023-03-07 ENCOUNTER — APPOINTMENT (OUTPATIENT)
Dept: INTERNAL MEDICINE | Facility: CLINIC | Age: 83
End: 2023-03-07
Payer: MEDICARE

## 2023-03-07 VITALS
RESPIRATION RATE: 16 BRPM | WEIGHT: 207 LBS | DIASTOLIC BLOOD PRESSURE: 68 MMHG | HEART RATE: 78 BPM | HEIGHT: 70 IN | OXYGEN SATURATION: 95 % | BODY MASS INDEX: 29.63 KG/M2 | SYSTOLIC BLOOD PRESSURE: 122 MMHG | TEMPERATURE: 97.8 F

## 2023-03-07 PROCEDURE — 99214 OFFICE O/P EST MOD 30 MIN: CPT | Mod: GC

## 2023-03-09 ENCOUNTER — NON-APPOINTMENT (OUTPATIENT)
Age: 83
End: 2023-03-09

## 2023-03-16 ENCOUNTER — APPOINTMENT (OUTPATIENT)
Dept: HEART AND VASCULAR | Facility: CLINIC | Age: 83
End: 2023-03-16
Payer: MEDICARE

## 2023-03-16 VITALS
DIASTOLIC BLOOD PRESSURE: 80 MMHG | OXYGEN SATURATION: 95 % | WEIGHT: 203 LBS | SYSTOLIC BLOOD PRESSURE: 140 MMHG | HEIGHT: 70 IN | TEMPERATURE: 97.3 F | HEART RATE: 69 BPM | BODY MASS INDEX: 29.06 KG/M2

## 2023-03-16 PROCEDURE — 93000 ELECTROCARDIOGRAM COMPLETE: CPT

## 2023-03-16 PROCEDURE — 99214 OFFICE O/P EST MOD 30 MIN: CPT

## 2023-03-16 RX ORDER — NIRMATRELVIR AND RITONAVIR 300-100 MG
20 X 150 MG & KIT ORAL
Qty: 30 | Refills: 0 | Status: DISCONTINUED | COMMUNITY
Start: 2023-02-02 | End: 2023-03-16

## 2023-03-16 RX ORDER — SENNOSIDES 8.6 MG TABLETS 8.6 MG/1
8.6 TABLET ORAL
Refills: 0 | Status: ACTIVE | COMMUNITY

## 2023-03-19 NOTE — HISTORY OF PRESENT ILLNESS
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

## 2023-03-19 NOTE — ASSESSMENT
[FreeTextEntry1] : - CAD on clopidogrel 75 mg daily and atorvastatin 40 mg daily\par - No afib on holter off eliquis at this point\par - fu in six months\par

## 2023-03-28 ENCOUNTER — NON-APPOINTMENT (OUTPATIENT)
Age: 83
End: 2023-03-28

## 2023-03-28 ENCOUNTER — APPOINTMENT (OUTPATIENT)
Dept: INTERNAL MEDICINE | Facility: CLINIC | Age: 83
End: 2023-03-28

## 2023-04-05 ENCOUNTER — APPOINTMENT (OUTPATIENT)
Dept: INTERNAL MEDICINE | Facility: CLINIC | Age: 83
End: 2023-04-05
Payer: MEDICARE

## 2023-04-05 VITALS
HEART RATE: 78 BPM | DIASTOLIC BLOOD PRESSURE: 77 MMHG | TEMPERATURE: 98.1 F | SYSTOLIC BLOOD PRESSURE: 138 MMHG | OXYGEN SATURATION: 94 % | RESPIRATION RATE: 22 BRPM

## 2023-04-05 DIAGNOSIS — G47.01 INSOMNIA DUE TO MEDICAL CONDITION: ICD-10-CM

## 2023-04-05 DIAGNOSIS — F32.A DEPRESSION, UNSPECIFIED: ICD-10-CM

## 2023-04-05 DIAGNOSIS — K64.9 UNSPECIFIED HEMORRHOIDS: ICD-10-CM

## 2023-04-05 DIAGNOSIS — E03.9 HYPOTHYROIDISM, UNSPECIFIED: ICD-10-CM

## 2023-04-05 DIAGNOSIS — D64.9 ANEMIA, UNSPECIFIED: ICD-10-CM

## 2023-04-05 PROCEDURE — 99214 OFFICE O/P EST MOD 30 MIN: CPT | Mod: GC

## 2023-04-05 RX ORDER — BUDESONIDE 1 MG/2ML
1 INHALANT ORAL
Qty: 1 | Refills: 0 | Status: DISCONTINUED | COMMUNITY
End: 2023-04-05

## 2023-04-05 RX ORDER — CLOPIDOGREL BISULFATE 75 MG/1
75 TABLET, FILM COATED ORAL DAILY
Qty: 90 | Refills: 3 | Status: ACTIVE | COMMUNITY
Start: 1900-01-01 | End: 1900-01-01

## 2023-04-05 RX ORDER — FERROUS SULFATE 325(65) MG
325 (65 FE) TABLET ORAL
Qty: 15 | Refills: 3 | Status: ACTIVE | COMMUNITY
Start: 1900-01-01 | End: 1900-01-01

## 2023-04-05 RX ORDER — BUDESONIDE AND FORMOTEROL FUMARATE DIHYDRATE 160; 4.5 UG/1; UG/1
160-4.5 AEROSOL RESPIRATORY (INHALATION) TWICE DAILY
Qty: 1 | Refills: 0 | Status: ACTIVE | COMMUNITY
Start: 2022-11-15 | End: 1900-01-01

## 2023-04-05 RX ORDER — MIRTAZAPINE 30 MG/1
30 TABLET, FILM COATED ORAL
Qty: 30 | Refills: 3 | Status: ACTIVE | COMMUNITY
Start: 1900-01-01 | End: 1900-01-01

## 2023-04-05 RX ORDER — LEVOTHYROXINE SODIUM 0.05 MG/1
50 TABLET ORAL
Qty: 60 | Refills: 3 | Status: ACTIVE | COMMUNITY
Start: 2022-12-06 | End: 1900-01-01

## 2023-04-05 RX ORDER — LIDOCAINE 5% 5 G/100G
5 CREAM TOPICAL
Qty: 1 | Refills: 0 | Status: ACTIVE | COMMUNITY
Start: 2023-04-05 | End: 1900-01-01

## 2023-04-05 NOTE — PHYSICAL EXAM
[No Acute Distress] : no acute distress [Well-Appearing] : well-appearing [No Respiratory Distress] : no respiratory distress  [Clear to Auscultation] : lungs were clear to auscultation bilaterally [No Varicosities] : no varicosities [No Edema] : there was no peripheral edema [No Extremity Clubbing/Cyanosis] : no extremity clubbing/cyanosis [No Joint Swelling] : no joint swelling [Grossly Normal Strength/Tone] : grossly normal strength/tone [Normal] : affect was normal and insight and judgment were intact [de-identified] : tachypnea and increased WOB after ambulating from waiting room to exam room. No wheezing or decreased breath sounds heard.  [de-identified] : ambulates with walker

## 2023-04-05 NOTE — REVIEW OF SYSTEMS
[Nasal Discharge] : nasal discharge [Shortness Of Breath] : shortness of breath [Cough] : cough [Dyspnea on Exertion] : dyspnea on exertion [Insomnia] : insomnia [Depression] : depression [Negative] : Neurological [Earache] : no earache [Hearing Loss] : no hearing loss [Nosebleeds] : no nosebleeds [Postnasal Drip] : no postnasal drip [Sore Throat] : no sore throat [Hoarseness] : no hoarseness [Wheezing] : no wheezing [Suicidal] : not suicidal [Anxiety] : no anxiety

## 2023-04-05 NOTE — END OF VISIT
[] : Resident [FreeTextEntry3] : \par 83 yo m w/ h/o anemia, hypothyroidism, copd on 2-3L NC , cva 2014, cad, chronic microaspiration here for  copd\par \par #COPD- will be moving into assisted living . needing more assistance and mood getting worse. financial problems. feels depressed. mild uri right now. using otc. if worse will start prednisone which he and his aide has at home and will let us know if it's the case. they have managed copd at home a lot before. also sent symbicort as they said he only has budesonide monotherapy at home\par #chronic aspiration- waiting for [facility] for assisted livig that would puree foods. \par #depressed- some passive SI. no plans. psych tried to outreach ih for therapy but couldn't reach him. will give them his home aides number who checks phone more\par \par #cad- f/u cards\par \par #uri- been five dayas, sinus congestion, some cough. no fevers. exam of lungs is clear. if worse or not much beter by monday will let us know. cont otc tx. symptoms pretty mild\par \par \par

## 2023-04-05 NOTE — HISTORY OF PRESENT ILLNESS
[FreeTextEntry1] : follow up and medication refill  [de-identified] : Mr Nakul is a 82M PMH of anemia, hypothyroidism, bronchitis/bronchiectasis, CAD s/p stent, CVA (2014), COPD (intermittent use of 2L-3L NC), and chronic microaspirations, depression, SSC of tonsil s/p resection and radiation 2004 who presents today with his caregiver/HHA Lupe for follow up and medication refills. Patient reports he needs refills on all of his medications. He also reports continued feelings of depression and hopelessness due to his financial issues, multiple medical issues, and most of all the fact that he will be moving into an assisted living facility soon as his HHA has been struggling to care for him given his comorbidities. 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 \par He also reports runny nose and sinus congestion with cough productive of clear sputum for the past 5 days. He has chronic sinusitis however thinks the past few days his nasal symptoms have gotten worse. Reports SOB is worse than normal as well however HHA denies any increase in oxygen requirements at home. He denies headache, vision changes, sore throat, fevers/chills, chest pain, abd pain, N/V/D, hemoptysis, LE edema.

## 2023-05-02 ENCOUNTER — EMERGENCY (EMERGENCY)
Facility: HOSPITAL | Age: 83
LOS: 1 days | Discharge: HOME | End: 2023-05-02
Attending: EMERGENCY MEDICINE | Admitting: EMERGENCY MEDICINE
Payer: MEDICARE

## 2023-05-02 ENCOUNTER — APPOINTMENT (OUTPATIENT)
Dept: INTERNAL MEDICINE | Facility: CLINIC | Age: 83
End: 2023-05-02
Payer: MEDICARE

## 2023-05-02 VITALS
DIASTOLIC BLOOD PRESSURE: 85 MMHG | RESPIRATION RATE: 20 BRPM | TEMPERATURE: 97.7 F | OXYGEN SATURATION: 94 % | SYSTOLIC BLOOD PRESSURE: 148 MMHG | HEART RATE: 97 BPM

## 2023-05-02 VITALS
WEIGHT: 210.1 LBS | SYSTOLIC BLOOD PRESSURE: 133 MMHG | DIASTOLIC BLOOD PRESSURE: 78 MMHG | OXYGEN SATURATION: 96 % | RESPIRATION RATE: 20 BRPM | TEMPERATURE: 98 F | HEART RATE: 62 BPM | HEIGHT: 72 IN

## 2023-05-02 VITALS
OXYGEN SATURATION: 94 % | DIASTOLIC BLOOD PRESSURE: 72 MMHG | RESPIRATION RATE: 20 BRPM | SYSTOLIC BLOOD PRESSURE: 145 MMHG | HEART RATE: 75 BPM | TEMPERATURE: 98 F

## 2023-05-02 DIAGNOSIS — J20.9 ACUTE BRONCHITIS, UNSPECIFIED: ICD-10-CM

## 2023-05-02 DIAGNOSIS — R05.1 ACUTE COUGH: ICD-10-CM

## 2023-05-02 DIAGNOSIS — Z99.81 DEPENDENCE ON SUPPLEMENTAL OXYGEN: ICD-10-CM

## 2023-05-02 DIAGNOSIS — J44.0 CHRONIC OBSTRUCTIVE PULMONARY DISEASE WITH (ACUTE) LOWER RESPIRATORY INFECTION: ICD-10-CM

## 2023-05-02 DIAGNOSIS — Z86.16 PERSONAL HISTORY OF COVID-19: ICD-10-CM

## 2023-05-02 DIAGNOSIS — R06.02 SHORTNESS OF BREATH: ICD-10-CM

## 2023-05-02 DIAGNOSIS — Z20.822 CONTACT WITH AND (SUSPECTED) EXPOSURE TO COVID-19: ICD-10-CM

## 2023-05-02 LAB
ANION GAP SERPL CALC-SCNC: 9 MMOL/L — SIGNIFICANT CHANGE UP (ref 5–17)
BASOPHILS # BLD AUTO: 0.03 K/UL — SIGNIFICANT CHANGE UP (ref 0–0.2)
BASOPHILS NFR BLD AUTO: 0.6 % — SIGNIFICANT CHANGE UP (ref 0–2)
BUN SERPL-MCNC: 12 MG/DL — SIGNIFICANT CHANGE UP (ref 7–23)
CALCIUM SERPL-MCNC: 9.7 MG/DL — SIGNIFICANT CHANGE UP (ref 8.4–10.5)
CHLORIDE SERPL-SCNC: 106 MMOL/L — SIGNIFICANT CHANGE UP (ref 96–108)
CO2 SERPL-SCNC: 25 MMOL/L — SIGNIFICANT CHANGE UP (ref 22–31)
CREAT SERPL-MCNC: 0.84 MG/DL — SIGNIFICANT CHANGE UP (ref 0.5–1.3)
EGFR: 86 ML/MIN/1.73M2 — SIGNIFICANT CHANGE UP
EOSINOPHIL # BLD AUTO: 0.21 K/UL — SIGNIFICANT CHANGE UP (ref 0–0.5)
EOSINOPHIL NFR BLD AUTO: 4.2 % — SIGNIFICANT CHANGE UP (ref 0–6)
FLUAV AG NPH QL: SIGNIFICANT CHANGE UP
FLUBV AG NPH QL: SIGNIFICANT CHANGE UP
GLUCOSE SERPL-MCNC: 104 MG/DL — HIGH (ref 70–99)
HCT VFR BLD CALC: 41.7 % — SIGNIFICANT CHANGE UP (ref 39–50)
HGB BLD-MCNC: 13.9 G/DL — SIGNIFICANT CHANGE UP (ref 13–17)
IMM GRANULOCYTES NFR BLD AUTO: 0.2 % — SIGNIFICANT CHANGE UP (ref 0–0.9)
LYMPHOCYTES # BLD AUTO: 1.04 K/UL — SIGNIFICANT CHANGE UP (ref 1–3.3)
LYMPHOCYTES # BLD AUTO: 20.6 % — SIGNIFICANT CHANGE UP (ref 13–44)
MCHC RBC-ENTMCNC: 33.3 GM/DL — SIGNIFICANT CHANGE UP (ref 32–36)
MCHC RBC-ENTMCNC: 33.3 PG — SIGNIFICANT CHANGE UP (ref 27–34)
MCV RBC AUTO: 100 FL — SIGNIFICANT CHANGE UP (ref 80–100)
MONOCYTES # BLD AUTO: 0.71 K/UL — SIGNIFICANT CHANGE UP (ref 0–0.9)
MONOCYTES NFR BLD AUTO: 14.1 % — HIGH (ref 2–14)
NEUTROPHILS # BLD AUTO: 3.04 K/UL — SIGNIFICANT CHANGE UP (ref 1.8–7.4)
NEUTROPHILS NFR BLD AUTO: 60.3 % — SIGNIFICANT CHANGE UP (ref 43–77)
NRBC # BLD: 0 /100 WBCS — SIGNIFICANT CHANGE UP (ref 0–0)
NT-PROBNP SERPL-SCNC: 70 PG/ML — SIGNIFICANT CHANGE UP (ref 0–300)
PLATELET # BLD AUTO: 247 K/UL — SIGNIFICANT CHANGE UP (ref 150–400)
POTASSIUM SERPL-MCNC: 4.6 MMOL/L — SIGNIFICANT CHANGE UP (ref 3.5–5.3)
POTASSIUM SERPL-SCNC: 4.6 MMOL/L — SIGNIFICANT CHANGE UP (ref 3.5–5.3)
RBC # BLD: 4.17 M/UL — LOW (ref 4.2–5.8)
RBC # FLD: 13.4 % — SIGNIFICANT CHANGE UP (ref 10.3–14.5)
RSV RNA NPH QL NAA+NON-PROBE: SIGNIFICANT CHANGE UP
SARS-COV-2 RNA SPEC QL NAA+PROBE: SIGNIFICANT CHANGE UP
SODIUM SERPL-SCNC: 140 MMOL/L — SIGNIFICANT CHANGE UP (ref 135–145)
TROPONIN T SERPL-MCNC: 0.01 NG/ML — SIGNIFICANT CHANGE UP (ref 0–0.01)
WBC # BLD: 5.04 K/UL — SIGNIFICANT CHANGE UP (ref 3.8–10.5)
WBC # FLD AUTO: 5.04 K/UL — SIGNIFICANT CHANGE UP (ref 3.8–10.5)

## 2023-05-02 PROCEDURE — 94640 AIRWAY INHALATION TREATMENT: CPT

## 2023-05-02 PROCEDURE — 71045 X-RAY EXAM CHEST 1 VIEW: CPT | Mod: 26,59

## 2023-05-02 PROCEDURE — 36415 COLL VENOUS BLD VENIPUNCTURE: CPT

## 2023-05-02 PROCEDURE — 80048 BASIC METABOLIC PNL TOTAL CA: CPT

## 2023-05-02 PROCEDURE — 96375 TX/PRO/DX INJ NEW DRUG ADDON: CPT

## 2023-05-02 PROCEDURE — 96374 THER/PROPH/DIAG INJ IV PUSH: CPT

## 2023-05-02 PROCEDURE — 85025 COMPLETE CBC W/AUTO DIFF WBC: CPT

## 2023-05-02 PROCEDURE — 84484 ASSAY OF TROPONIN QUANT: CPT

## 2023-05-02 PROCEDURE — 83880 ASSAY OF NATRIURETIC PEPTIDE: CPT

## 2023-05-02 PROCEDURE — 99285 EMERGENCY DEPT VISIT HI MDM: CPT | Mod: 25

## 2023-05-02 PROCEDURE — 87637 SARSCOV2&INF A&B&RSV AMP PRB: CPT

## 2023-05-02 PROCEDURE — 99285 EMERGENCY DEPT VISIT HI MDM: CPT | Mod: CS

## 2023-05-02 PROCEDURE — 99214 OFFICE O/P EST MOD 30 MIN: CPT | Mod: GC

## 2023-05-02 PROCEDURE — 71046 X-RAY EXAM CHEST 2 VIEWS: CPT | Mod: 26

## 2023-05-02 PROCEDURE — 71046 X-RAY EXAM CHEST 2 VIEWS: CPT

## 2023-05-02 PROCEDURE — 71045 X-RAY EXAM CHEST 1 VIEW: CPT

## 2023-05-02 RX ORDER — IPRATROPIUM/ALBUTEROL SULFATE 18-103MCG
3 AEROSOL WITH ADAPTER (GRAM) INHALATION
Refills: 0 | Status: COMPLETED | OUTPATIENT
Start: 2023-05-02 | End: 2023-05-02

## 2023-05-02 RX ORDER — CEFPODOXIME PROXETIL 100 MG
1 TABLET ORAL
Qty: 14 | Refills: 0
Start: 2023-05-02 | End: 2023-05-08

## 2023-05-02 RX ORDER — CEFTRIAXONE 500 MG/1
1000 INJECTION, POWDER, FOR SOLUTION INTRAMUSCULAR; INTRAVENOUS ONCE
Refills: 0 | Status: COMPLETED | OUTPATIENT
Start: 2023-05-02 | End: 2023-05-02

## 2023-05-02 RX ADMIN — Medication 3 MILLILITER(S): at 16:36

## 2023-05-02 RX ADMIN — Medication 125 MILLIGRAM(S): at 15:46

## 2023-05-02 RX ADMIN — CEFTRIAXONE 100 MILLIGRAM(S): 500 INJECTION, POWDER, FOR SOLUTION INTRAMUSCULAR; INTRAVENOUS at 19:08

## 2023-05-02 RX ADMIN — Medication 3 MILLILITER(S): at 15:46

## 2023-05-02 RX ADMIN — Medication 3 MILLILITER(S): at 15:58

## 2023-05-02 NOTE — PLAN
[FreeTextEntry1] : #COPD, on home 2-3L NC intermittently\par Upon entering examination room today, 5/2/23, pt visibly dyspneic despite satting 94% on RA while stationary. Pt noted to desaturate to 88% while ambulatory. Pt likely in acute COPD exacerbation 2/2 viral URI contracted on 4/28 as discussed below.\par - EMS activated today, pt encouraged to go to ER for further workup of possible lower respiratory involvement as well as to receive IV steroids \par -c/w symbicort inhaler BID\par -c/w albuterol rescue inhaler \par -f/u with pulm Dr. Mack, pt encouraged to see him within month once medically stabilized \par \par #viral URI \par Patient with rhinorrhea, sinus congestion, and cough productive of clear sputum since Friday 4/28. Of note, pt had similar presentation when he was seen by Dr. Victoria on 4/5/23. Pt continues to deny chest pain, fevers/chills however has been having worsening CHRISTIAN from baseline. JEAN PIERRE Andrade has encouraged pt to go to ER for treatment on Friday, however was adamantly refused by patient as he is uncomfortable in hospitals. Pt afebrile with lung exams pertinent for inspiratory/exp wheezing on the R. Less likely bacterial involvement given short duration of sx and upper respiratory involvement, however, pt noted to be dyspneic with some recruitment of accessory muscles, audible wheezes, and uncomfortable appearance. \par Plan:\par - EMS called after discussion with pt regarding tenuous nature of his symptomatology \par - Pt encouraged to go to ER to receive IV steroids as well as possible abx treatment and further workup \par - c/w nebulizer treatments, tylenol prn, mucinex, robitussin for symptom management when d/c from ED \par - Will f/u with pt this week when pt is medically stabilized, however, pt also encouraged to f/u with his Pulmonologist \par \par #depression\par -per previous encounter on 4/5/2023, patient with feelings of depression and hopelessness surrounding his potential move to an assisted living facility. has HHA/caregiver currently. only family in area is his half brother, his wife passed away a few years ago. he reports he feels lousy and sometimes thinks about taking too much of his anti-depressants however has no current intent. 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 associated with insomnia as well. \par -c/w escitalopram 20mg qd, trazodone 50mg qhs and mirtazapine 30mg qhs\par - Radha ([phone]) from [facility] contacted and will f/u with pt\par \par #hypothyroidism\par -TSH wnl 11/2022\par -c/w synthroid 50mcg\par \par #CAD s/p stent\par #hx of CVA \par -c/w plavix 75 qd and atorvastatin 40mg qhs\par -f/u with cardiology Dr. Wiley in 6 months \par \par RTC in 1 week for follow up on SoB and comorbidities. Pt encouraged to make appointment with his Pulmonologist Dr. Mack once he is medically stabilized \par

## 2023-05-02 NOTE — END OF VISIT
[] : Resident [FreeTextEntry3] : Here for increased SOB, wheezing \par Noted by HHA, started on Friday, with worsening cough, SOB, wheezing, increased nebulizer use \par On exam, tachypneic, diffuse wheezing, increased WOB, noted to desaturate to 88 upon ambulation. 94% at rest \par Rec ER visit for COPD exacerbation, eval for PNA\par Will RTC within 1 week after ER visit

## 2023-05-02 NOTE — ED ADULT NURSE NOTE - OBJECTIVE STATEMENT
.  82years male alert mental state (AOX3) received by EMS.  pt is ambulatory himself.  -complain of SOB.  Hx of COPD. pt has worse coughing and chest pain since Friday.  -denied chest pain, fever, dizziness, headache, n/v/d, abdomen pain, legs swelling.  Pt is in the bed comfortably at this time. Will continue to monitor and document any changes.        -Allergy: N/A.

## 2023-05-02 NOTE — ED PROVIDER NOTE - WR ORDER DATE AND TIME 2
New Patient Office Visit      Patient Name: Link Valdez  : 1986   MRN: [id number]     Referring Provider: Deidre Rowland MD    Chief Complaint:  Psychiatric evaluation related to:     ICD-10-CM ICD-9-CM   1. Generalized anxiety disorder  F41.1 300.02   2. Stress response  F43.0 308.9   3. Sleep disturbance  G47.9 780.50   4. Concentration deficit  R41.840 799.51        History of Present Illness:   Link Valdez is a 36 y.o. male who is here today for psychiatric evaluation on referral from Dr. Rowland. He reached out to his PCP when he started feeling like his anxiety was overwhelming and was started on Prozac and Hydroxyzine at that time. He also started seeing a therapist through his [facility] a few weeks ago.      AMELIA:   Mr. Valdez reports that he has been an anxious person all his life, with increased anxiety surrounding any changes. He recently bought a house and is undergoing multiple life changes and vacations over the last few months leaving him feeling overwhelmed. With all of these stressors he reports that his mood has been more down, anhedonia, decreased sleep, racing thoughts, decreased energy, motivation, concentration, decreased appetite, and an overall sense of feeling like something awful might happen. He reports having multiple panic attacks at the beginning of all of the change in November but hasn't had any for the last three weeks.     Stress response:   Him and his wife had been searching for a house for 15+ months and they just moved into their new house last weekend. He states that it has been a very stressful experience that he has felt very overwhelmed with and has affected his mood and sleep.     Sleep disturbance:   He reports only getting 4-5 hours of sleep a night. No difficulty falling asleep, but is unable to stay asleep, sometimes waking up for the day around 2 am with anxious thoughts.     Current treatment(s): Prozac 20 mg/day, Hydroxyzine 25 mg PRN.      Subjective      Review of Systems:   Review of Systems   Constitutional: Negative.    Psychiatric/Behavioral: Positive for decreased concentration, sleep disturbance, depressed mood and stress. Negative for self-injury and suicidal ideas. The patient is nervous/anxious.        PHQ-9 Depression Screening Score: 9     Psychiatric History:     Previous Diagnoses: denies  Outpatient treatment history: denies  Previous medications: denies  Inpatient admissions: denies  History of suicide attempts: denies  Trauma/Abuse History: denies  Developmental History: [employer] list in highschool       SUICIDE RISK ASSESSMENT    Does patient have thoughts of suicide? denies  Does patient have intent for suicide? denies  Does patient have a current plan for suicide? denies    History of suicide attempts: denies  Family history of suicide or attempts: denies  Protective factors: no current ideation, plan, or intent, supportive family and friends     Risk Level: Low     SAFETY PLAN    Patient agrees to call 911/go to the nearest emergency department if he/she develops new or worsening SI, or develops intent or plan to act on these thoughts. Patient is agreeable to all elements of safety plan and verbalizes understanding.     Social History:    Living situation: Lives with wife in new house they bought   Work/school: Work from home for [employer] in their [employer] department   Recreation: travel, tailgating, being outdoors  Support system: wife, mom, sister   Illicit substance use: THC edibles to help sleep at night a few times a week   Alcohol use: 3-4 days/week (2-3 drinks)  Tobacco use: denies     Past Medical History:   Past Medical History:   Diagnosis Date   • Cyst of skin 10/26/2020   • Low back pain Reoccurring lower back pain       Past Surgical History:   Past Surgical History:   Procedure Laterality Date   • COLONOSCOPY  2013   • CYST REMOVAL  01/2021    Neck   • PILONIDAL CYSTECTOMY N/A 2010    Lower back       Family History:  "  Family History   Problem Relation Age of Onset   • COPD Mother         smoker   • Diabetes Father    • Obesity Sister    • Diabetes Sister    • No Known Problems Maternal Grandmother    • Heart disease Maternal Grandfather    • No Known Problems Paternal Grandmother    • Heart disease Paternal Grandfather        Family Psychiatric History:  ADHD: sister/nephew     Medications:     Current Outpatient Medications:   •  FLUoxetine (PROzac) 20 MG capsule, Take 1 capsule by mouth Daily., Disp: 30 capsule, Rfl: 1  •  hydrOXYzine (ATARAX) 25 MG tablet, Take 2 tablets by mouth 3 (Three) Times a Day As Needed for Anxiety. May take 1 to 2 tablets by mouth as needed., Disp: 30 tablet, Rfl: 1  •  ibuprofen (ADVIL,MOTRIN) 200 MG tablet, Take 2 tablets by mouth Every 6 (Six) Hours As Needed., Disp: , Rfl:   •  propranolol (INDERAL) 10 MG tablet, Take 1-2 tablets by mouth 2 (Two) Times a Day As Needed (for anxiety/panic)., Disp: 60 tablet, Rfl: 2  •  traZODone (DESYREL) 50 MG tablet, Take 1 tablet by mouth At Night As Needed for Sleep., Disp: 30 tablet, Rfl: 0    Allergies:   No Known Allergies      Objective     Physical Exam:  Vital Signs:   Vitals:    01/19/23 0819   BP: 120/80   Pulse: 62   Weight: 76.3 kg (168 lb 3.2 oz)   Height: 172.7 cm (68\")     Body mass index is 25.57 kg/m².     Physical Exam      Mental Status Exam:   Appearance: this is an adult  male, dressed casually and appropriately for weather   Hygiene: good  Cooperation: calm and cooperative   Eye Contact: good  Psychomotor Behavior: WNL   Mood: anxious   Affect: congruent, full-range   Speech: normal rate, rhythm, and tone   Thought Process: linear, goal-directed  Thought Content: mood congruent  Suicidal: denies   Homicidal: denies  Hallucinations: denies   Delusion: denies  Memory: intact  Orientation: x4  Reliability: good  Insight: good  Judgement: good  Impulse Control: no current concernts      Assessment / Plan      Visit Diagnosis/Orders " Placed This Visit:  Diagnoses and all orders for this visit:    1. Generalized anxiety disorder (Primary)  -     propranolol (INDERAL) 10 MG tablet; Take 1-2 tablets by mouth 2 (Two) Times a Day As Needed (for anxiety/panic).  Dispense: 60 tablet; Refill: 2  -     Ambulatory Referral to Behavioral Health    2. Stress response  -     Ambulatory Referral to Behavioral Health    3. Sleep disturbance  -     traZODone (DESYREL) 50 MG tablet; Take 1 tablet by mouth At Night As Needed for Sleep.  Dispense: 30 tablet; Refill: 0  -     Ambulatory Referral to Behavioral Health    4. Concentration deficit         Differential:   Rule out ADHD/, DD     Plan:   1. Continue Prozac 20 mg daily  2. Continue Hydroxyzine 25 mg as needed  3. Start Propranolol 10 mg BID as needed  4. Start Trazodone 50 mg nightlt as needed for 1 month   5. Refer to psychotherapy with Beny Álvarez LCSW    Continue supportive psychotherapy efforts and medications as indicated. Treatment and medication options discussed during today's visit. Patient ackowledged and verbally consented to continue with current treatment plan and was educated on the importance of compliance with treatment and follow-up appointments. Patient seems reasonably able to adhere to treatment plan.      Medication Considerations:  ADAM reviewed and appropriate. Discussed medication options and treatment plan of prescribed medication(s) as well as the risks, benefits, and potential side effects. Patient is agreeable to call the office with any worsening of symptoms or onset of side effects. Patient is agreeable to call 911 or go to the nearest ER should he/she begin having SI/HI.    Treatment Goals:    Anxiety: patient will experience decreased anxiety with medication and psychotherapy   Sleep: patient's sleep will improve to 8 hours/night with improved sleep hygiene and medication assistance   Concentration deficit: May improve secondary to treatment of anxiety, we will  continue to monitor for ADHD    Quality Measures:     Tobacco Use/Cessation:   Never smoker    I advised Link Valdez of the risks of tobacco use.     Follow Up:   Return in about 6 weeks (around 3/2/2023) for Medication Mangement Follow Up.    Student: HN    I attest that I have reviewed the student note and that the components of the history of the present illness, the physical exam, and the assessment and plan documented were performed by me or were performed in my presence by the student and verified by me.    LAURA Aguirre, PMHNP-BC    02-May-2023 14:50

## 2023-05-02 NOTE — PHYSICAL EXAM
[Well Nourished] : well nourished [Well Developed] : well developed [Normal Sclera/Conjunctiva] : normal sclera/conjunctiva [PERRL] : pupils equal round and reactive to light [EOMI] : extraocular movements intact [Normal Outer Ear/Nose] : the outer ears and nose were normal in appearance [Normal Oropharynx] : the oropharynx was normal [No JVD] : no jugular venous distention [No Lymphadenopathy] : no lymphadenopathy [Supple] : supple [Thyroid Normal, No Nodules] : the thyroid was normal and there were no nodules present [Normal Rate] : normal rate  [Regular Rhythm] : with a regular rhythm [Normal S1, S2] : normal S1 and S2 [No Murmur] : no murmur heard [No Carotid Bruits] : no carotid bruits [No Abdominal Bruit] : a ~M bruit was not heard ~T in the abdomen [No Varicosities] : no varicosities [Pedal Pulses Present] : the pedal pulses are present [No Edema] : there was no peripheral edema [No Palpable Aorta] : no palpable aorta [No Extremity Clubbing/Cyanosis] : no extremity clubbing/cyanosis [Soft] : abdomen soft [Non Tender] : non-tender [No Masses] : no abdominal mass palpated [No HSM] : no HSM [Normal Bowel Sounds] : normal bowel sounds [Normal Posterior Cervical Nodes] : no posterior cervical lymphadenopathy [Normal Anterior Cervical Nodes] : no anterior cervical lymphadenopathy [No CVA Tenderness] : no CVA  tenderness [No Spinal Tenderness] : no spinal tenderness [No Joint Swelling] : no joint swelling [Grossly Normal Strength/Tone] : grossly normal strength/tone [No Rash] : no rash [Coordination Grossly Intact] : coordination grossly intact [No Focal Deficits] : no focal deficits [Normal Gait] : normal gait [Deep Tendon Reflexes (DTR)] : deep tendon reflexes were 2+ and symmetric [Normal Affect] : the affect was normal [Normal Insight/Judgement] : insight and judgment were intact [de-identified] : Pt uncomfortable appearing, taking deep labored breaths.  [de-identified] : Audible wheezes. Decreased breath sounds on the L, + inspiratory/exp wheezes on R. + Barrel chest. + Some recruitment of abdominal muscles during exam.  [de-identified] : + distended abdomen.

## 2023-05-02 NOTE — ASSESSMENT
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

## 2023-05-02 NOTE — ED PROVIDER NOTE - PATIENT PORTAL LINK FT
You can access the FollowMyHealth Patient Portal offered by [facility] by registering at the following website: [email or web address]. By joining [facility]’s FollowMyHealth portal, you will also be able to view your health information using other applications (apps) compatible with our system.

## 2023-05-02 NOTE — HISTORY OF PRESENT ILLNESS
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 \par \par Pt alongside his HHA Lupe state that since Friday, pt has had nasal congestion and cough that have been unrelieved with OTC Benzonatate, chest percussion therapy, and nebulizer treatments which pt has been receiving 2x per day. Pt has been using home O2 at 2-3L, but was noted by Lupe to have increased work of breathing and audible wheezing. She urged him to go to the ER on Friday 4/28 when his sx were most severe, however pt adamantly refused as he "feels uncomfortable in the hospital". Pt's sx have mildly improved since Friday, however, pt noted to desaturate with ambulation and easily tire out during HPI. \par \par Pt denied fevers, chills, headache, dizziness, CP, nausea, vomiting, diarrhea, constipation, melena, hematochezia, urinary or fecal incontinence, dysuria, hematuria. EMS activated later during visit. Pt endorsing persistent cough with mucus that does not expectorate despite chest PT and OTC mucolytics. Pt also states he uses multiple Kleenex tissues per day for nasal congestion. \par

## 2023-05-02 NOTE — ED PROVIDER NOTE - CLINICAL SUMMARY MEDICAL DECISION MAKING FREE TEXT BOX
worsening cough w questionable abnml on cxr- sx improved w nebs steroids- d/w px and 24 hr aide- aide knows to give nebs 3-4 times a day/steroids abx and physiotherapy for chest - si/sx to return d/w her

## 2023-05-02 NOTE — ED PROVIDER NOTE - OBJECTIVE STATEMENT
81 yo M w PMH of COPD, recently tested covid positive, on Paxlovid, s/p vax x5, p/w cough for 5 days, mild shortness of breath. Patient is on 2 L NC oxygen at home as needed, O2 sat typically 90% 81 yo M w PMH of COPD, recently tested covid positive, on Paxlovid, s/p vax x5, p/w cough for 5 days, mild shortness of breath. Patient is on 2 L NC oxygen at home as needed, O2 sat typically 90%- today persistent cough prod white sputum- using nebs twice a day- not currently using oxygen- here w aid  mod severity  juan jose po no f/c no n/v  no sig exac factors- sx temp improve w nebs

## 2023-05-09 ENCOUNTER — APPOINTMENT (OUTPATIENT)
Dept: INTERNAL MEDICINE | Facility: CLINIC | Age: 83
End: 2023-05-09
Payer: MEDICARE

## 2023-05-09 VITALS
DIASTOLIC BLOOD PRESSURE: 84 MMHG | WEIGHT: 210 LBS | HEART RATE: 84 BPM | BODY MASS INDEX: 30.06 KG/M2 | HEIGHT: 70 IN | OXYGEN SATURATION: 95 % | SYSTOLIC BLOOD PRESSURE: 142 MMHG

## 2023-05-09 DIAGNOSIS — J18.9 PNEUMONIA, UNSPECIFIED ORGANISM: ICD-10-CM

## 2023-05-09 DIAGNOSIS — J44.1 CHRONIC OBSTRUCTIVE PULMONARY DISEASE WITH (ACUTE) EXACERBATION: ICD-10-CM

## 2023-05-09 DIAGNOSIS — T17.908A UNSPECIFIED FOREIGN BODY IN RESPIRATORY TRACT, PART UNSPECIFIED CAUSING OTHER INJURY, INITIAL ENCOUNTER: ICD-10-CM

## 2023-05-09 DIAGNOSIS — I63.132 CEREBRAL INFARCTION DUE TO EMBOLISM OF LEFT CAROTID ARTERY: ICD-10-CM

## 2023-05-09 DIAGNOSIS — I25.10 ATHEROSCLEROTIC HEART DISEASE OF NATIVE CORONARY ARTERY W/OUT ANGINA PECTORIS: ICD-10-CM

## 2023-05-09 DIAGNOSIS — R49.0 DYSPHONIA: ICD-10-CM

## 2023-05-09 DIAGNOSIS — J00 ACUTE NASOPHARYNGITIS [COMMON COLD]: ICD-10-CM

## 2023-05-09 PROCEDURE — 99215 OFFICE O/P EST HI 40 MIN: CPT | Mod: GC,25

## 2023-05-09 RX ORDER — PREDNISONE 50 MG/1
50 TABLET ORAL DAILY
Refills: 0 | Status: COMPLETED | COMMUNITY
Start: 2023-05-02 | End: 2023-05-06

## 2023-05-09 RX ORDER — CEFPODOXIME PROXETIL 200 MG/1
200 TABLET, FILM COATED ORAL
Refills: 0 | Status: COMPLETED | COMMUNITY
Start: 2023-05-02 | End: 2023-05-10

## 2023-05-10 PROBLEM — I25.10 CAD (CORONARY ARTERY DISEASE): Status: ACTIVE | Noted: 2022-11-03

## 2023-05-10 PROBLEM — J18.9 CAP (COMMUNITY ACQUIRED PNEUMONIA): Status: ACTIVE | Noted: 2023-05-09

## 2023-05-10 PROBLEM — J44.1 COPD EXACERBATION: Status: ACTIVE | Noted: 2023-05-10

## 2023-05-12 ENCOUNTER — APPOINTMENT (OUTPATIENT)
Dept: PULMONOLOGY | Facility: CLINIC | Age: 83
End: 2023-05-12
Payer: MEDICARE

## 2023-05-12 VITALS
HEIGHT: 70 IN | HEART RATE: 80 BPM | TEMPERATURE: 97.4 F | OXYGEN SATURATION: 93 % | SYSTOLIC BLOOD PRESSURE: 140 MMHG | DIASTOLIC BLOOD PRESSURE: 80 MMHG

## 2023-05-12 PROCEDURE — 99214 OFFICE O/P EST MOD 30 MIN: CPT

## 2023-05-12 RX ORDER — PREDNISONE 20 MG/1
20 TABLET ORAL
Qty: 10 | Refills: 3 | Status: ACTIVE | COMMUNITY
Start: 2023-05-12 | End: 1900-01-01

## 2023-05-12 NOTE — PROCEDURE
[FreeTextEntry1] : wheezing when walking\par \par lungs are surprisingly clear\par \par how much is upper airway\par \par is on symbicort \par \par will give 40mg pred\par \par and tucker trelgey\par \par \par \par

## 2023-05-13 NOTE — HISTORY OF PRESENT ILLNESS
[TextBox_4] : here with wheezing and dyspnea when ambulating.  patient with extensive history of head a neck cancer, and history of stroke,  but highly intelligent.  upset about fragmentation of care by medical department.  he is on symbicort,  and i'm sure that he cannot get any drug delivery with this method of administration.  he is unable to to pfts so i'm not at all sure if he has copd or not, but last time I saw him after administration of prednisone his wheezing had cleared., so i had no suggestions.  today he is wheezing as he ambulates, but upon sitting down his lungs are clear.

## 2023-05-13 NOTE — DISCUSSION/SUMMARY
[FreeTextEntry1] : it's not clear to me if this is upper airway wheeze versus lungs.\par \par however he was a heavy smoker and last time his wheezing had cleared with steroids\par \par i gave him samples of trelegy which he is able to self administer\par \par and a short course of prednisone ( five days)\par \par he was never told to rinse out after using symbicort\par \par return in a month.   I wiill have to take responsibility for his breathing

## 2023-05-13 NOTE — PHYSICAL EXAM
[Normal Rate/Rhythm] : normal rate/rhythm [No Resp Distress] : no resp distress [Clear to Auscultation Bilaterally] : clear to auscultation bilaterally [No Focal Deficits] : no focal deficits [Normal Affect] : normal affect [Oriented x3] : oriented x3 [TextBox_2] : clear post surgical ent [TextBox_11] : distortion of oral pharynx [TextBox_44] : surgical findings [TextBox_68] : his lungs are clear upon ausculation despite severe wheeze when he walked into the room that could be heard wihtout a stethoscope

## 2023-05-13 NOTE — PHYSICAL EXAM
[Normal Rate/Rhythm] : normal rate/rhythm [No Resp Distress] : no resp distress [Clear to Auscultation Bilaterally] : clear to auscultation bilaterally [No Focal Deficits] : no focal deficits [Oriented x3] : oriented x3 [Normal Affect] : normal affect [TextBox_2] : clear post surgical ent [TextBox_11] : distortion of oral pharynx [TextBox_44] : surgical findings [TextBox_68] : his lungs are clear upon ausculation despite severe wheeze when he walked into the room that could be heard wihtout a stethoscope

## 2023-05-13 NOTE — REVIEW OF SYSTEMS
[Cough] : no cough [Wheezing] : wheezing [Frequency] : dysuria [Depression] : depression [Myalgias] : myalgias [Negative] : Endocrine [TextBox_14] : status post surgery or oral cavity with problems with sweeping secretions [TextBox_3] : here with aide [TextBox_30] : bilatera wheeze heard when ambulating , but clear once he sits down  [TextBox_119] : some impariment from stroke  difficulty talking due to surgery/ stroke?

## 2023-05-15 ENCOUNTER — APPOINTMENT (OUTPATIENT)
Dept: INTERNAL MEDICINE | Facility: CLINIC | Age: 83
End: 2023-05-15
Payer: MEDICARE

## 2023-05-15 VITALS
HEART RATE: 79 BPM | SYSTOLIC BLOOD PRESSURE: 103 MMHG | WEIGHT: 210 LBS | OXYGEN SATURATION: 93 % | TEMPERATURE: 97.1 F | BODY MASS INDEX: 30.06 KG/M2 | HEIGHT: 70 IN | DIASTOLIC BLOOD PRESSURE: 67 MMHG

## 2023-05-15 DIAGNOSIS — R05.3 CHRONIC COUGH: ICD-10-CM

## 2023-05-15 DIAGNOSIS — J44.9 CHRONIC OBSTRUCTIVE PULMONARY DISEASE, UNSPECIFIED: ICD-10-CM

## 2023-05-15 DIAGNOSIS — E66.9 OBESITY, UNSPECIFIED: ICD-10-CM

## 2023-05-15 PROCEDURE — 99214 OFFICE O/P EST MOD 30 MIN: CPT

## 2023-05-16 PROBLEM — J44.9 COPD (CHRONIC OBSTRUCTIVE PULMONARY DISEASE): Status: ACTIVE | Noted: 2023-04-05

## 2023-05-16 PROBLEM — E66.9 OBESITY: Status: ACTIVE | Noted: 2023-05-16

## 2023-09-12 ENCOUNTER — APPOINTMENT (OUTPATIENT)
Dept: HEART AND VASCULAR | Facility: CLINIC | Age: 83
End: 2023-09-12

## 2023-09-28 ENCOUNTER — APPOINTMENT (OUTPATIENT)
Dept: OTOLARYNGOLOGY | Facility: CLINIC | Age: 83
End: 2023-09-28

## 2025-07-03 NOTE — ED ADULT TRIAGE NOTE - AS HEIGHT TYPE
Spoke with patient. Patient reports feeling good. Reports dialysis yesterday and feeling better. Patient discharged from the hospital. No reports of fever, cough, shortness of breath since discharge. Patient reports no redness, rash, irritation, or infection in groin area. Patient agrees to continue with scheduled ablation on 7/15/2025. Patient appreciated the call.   
stated